# Patient Record
Sex: MALE | Race: WHITE | NOT HISPANIC OR LATINO | RURAL
[De-identification: names, ages, dates, MRNs, and addresses within clinical notes are randomized per-mention and may not be internally consistent; named-entity substitution may affect disease eponyms.]

---

## 2021-03-04 ENCOUNTER — OFFICE VISIT (OUTPATIENT)
Dept: FAMILY MEDICINE CLINIC | Facility: CLINIC | Age: 31
End: 2021-03-04

## 2021-03-04 ENCOUNTER — LAB (OUTPATIENT)
Dept: LAB | Facility: HOSPITAL | Age: 31
End: 2021-03-04

## 2021-03-04 VITALS
WEIGHT: 250 LBS | DIASTOLIC BLOOD PRESSURE: 82 MMHG | HEART RATE: 68 BPM | SYSTOLIC BLOOD PRESSURE: 118 MMHG | RESPIRATION RATE: 18 BRPM | OXYGEN SATURATION: 96 % | HEIGHT: 74 IN | BODY MASS INDEX: 32.08 KG/M2

## 2021-03-04 DIAGNOSIS — L05.91 PILONIDAL CYST WITHOUT INFECTION: ICD-10-CM

## 2021-03-04 DIAGNOSIS — Z11.59 NEED FOR HEPATITIS C SCREENING TEST: ICD-10-CM

## 2021-03-04 DIAGNOSIS — Z11.3 ROUTINE SCREENING FOR STI (SEXUALLY TRANSMITTED INFECTION): Primary | ICD-10-CM

## 2021-03-04 DIAGNOSIS — Z13.220 LIPID SCREENING: ICD-10-CM

## 2021-03-04 PROCEDURE — 87491 CHLMYD TRACH DNA AMP PROBE: CPT | Performed by: STUDENT IN AN ORGANIZED HEALTH CARE EDUCATION/TRAINING PROGRAM

## 2021-03-04 PROCEDURE — 99204 OFFICE O/P NEW MOD 45 MIN: CPT | Performed by: STUDENT IN AN ORGANIZED HEALTH CARE EDUCATION/TRAINING PROGRAM

## 2021-03-04 PROCEDURE — 86592 SYPHILIS TEST NON-TREP QUAL: CPT | Performed by: STUDENT IN AN ORGANIZED HEALTH CARE EDUCATION/TRAINING PROGRAM

## 2021-03-04 PROCEDURE — 86803 HEPATITIS C AB TEST: CPT | Performed by: STUDENT IN AN ORGANIZED HEALTH CARE EDUCATION/TRAINING PROGRAM

## 2021-03-04 PROCEDURE — 80061 LIPID PANEL: CPT | Performed by: STUDENT IN AN ORGANIZED HEALTH CARE EDUCATION/TRAINING PROGRAM

## 2021-03-04 PROCEDURE — 87661 TRICHOMONAS VAGINALIS AMPLIF: CPT | Performed by: STUDENT IN AN ORGANIZED HEALTH CARE EDUCATION/TRAINING PROGRAM

## 2021-03-04 PROCEDURE — 85025 COMPLETE CBC W/AUTO DIFF WBC: CPT | Performed by: STUDENT IN AN ORGANIZED HEALTH CARE EDUCATION/TRAINING PROGRAM

## 2021-03-04 PROCEDURE — 87591 N.GONORRHOEAE DNA AMP PROB: CPT | Performed by: STUDENT IN AN ORGANIZED HEALTH CARE EDUCATION/TRAINING PROGRAM

## 2021-03-04 PROCEDURE — 80053 COMPREHEN METABOLIC PANEL: CPT | Performed by: STUDENT IN AN ORGANIZED HEALTH CARE EDUCATION/TRAINING PROGRAM

## 2021-03-04 PROCEDURE — G0432 EIA HIV-1/HIV-2 SCREEN: HCPCS | Performed by: STUDENT IN AN ORGANIZED HEALTH CARE EDUCATION/TRAINING PROGRAM

## 2021-03-04 NOTE — PROGRESS NOTES
"   Subjective:  Edinson Thomas is a 30 y.o. male who presents for establish care    Polyp/cyst; first noticed in 2016; went to [facility], had it drained; located in gluteal cleft; no issues with BM, soft, daily BM; No fever, chills, n/v/d hematochezia. Currently asymptomatic, however has never completely healed. States has two different areas which are connected. Currently denied any drainage.  States due to recurrence, would like definitive therapy.       Health maintenance; Had COVID immunizations, has not had labs and quite some time, is sexually active, currently in a monogamous relationship for approximately 1 year, was with multiple sexual partners prior to that.  No dysuria, penile discharge, testicular pain or genital rashes.  Has never been screened for hepatitis C.     Vitals:    Vitals:    03/04/21 1327   BP: 118/82   Pulse: 68   Resp: 18   SpO2: 96%   Weight: 113 kg (250 lb)   Height: 188 cm (74\")     Body mass index is 32.1 kg/m².    Review of Systems  Review of Systems   Constitutional: Negative for chills and fever.   HENT: Negative for congestion and sore throat.    Eyes: Negative for pain and visual disturbance.   Respiratory: Negative for cough and shortness of breath.    Cardiovascular: Negative for chest pain and palpitations.   Gastrointestinal: Negative for nausea and vomiting.   Endocrine: Negative for polydipsia and polyuria.   Genitourinary: Negative for dysuria and hematuria.   Skin: Negative for rash and wound.   Neurological: Negative for dizziness and headaches.   Psychiatric/Behavioral: Negative for behavioral problems and confusion.       There is no problem list on file for this patient.    History reviewed. No pertinent surgical history.  Social History     Socioeconomic History   • Marital status: Single     Spouse name: Not on file   • Number of children: Not on file   • Years of education: Not on file   • Highest education level: Not on file   Tobacco Use   • Smoking status: " Never Smoker   • Smokeless tobacco: Never Used   Substance and Sexual Activity   • Alcohol use: Never     Frequency: Never   • Drug use: Never     Family History   Problem Relation Age of Onset   • Diabetes Father    • Heart failure Father    • Heart attack Father      No visits with results within 6 Month(s) from this visit.   Latest known visit with results is:   No results found for any previous visit.      No image results found.    [unfilled]  Immunization History   Administered Date(s) Administered   • COVID-19 (MODERNA) 12/30/2020, 01/28/2021       The following portions of the patient's history were reviewed and updated as appropriate: allergies, current medications, past family history, past medical history, past social history, past surgical history and problem list.        Physical Exam  Physical Exam  Constitutional:       General: He is not in acute distress.  HENT:      Head: Normocephalic and atraumatic.      Right Ear: Tympanic membrane and ear canal normal.      Left Ear: Tympanic membrane and ear canal normal.      Mouth/Throat:      Mouth: Mucous membranes are moist.      Pharynx: Oropharynx is clear. No posterior oropharyngeal erythema.   Eyes:      Extraocular Movements: Extraocular movements intact.      Pupils: Pupils are equal, round, and reactive to light.   Cardiovascular:      Rate and Rhythm: Normal rate and regular rhythm.      Heart sounds: Normal heart sounds. No murmur.   Pulmonary:      Effort: Pulmonary effort is normal.      Breath sounds: Normal breath sounds.   Abdominal:      General: Bowel sounds are normal.      Palpations: Abdomen is soft.      Tenderness: There is no abdominal tenderness.   Musculoskeletal:         General: No swelling.      Right lower leg: No edema.      Left lower leg: No edema.   Skin:     Coloration: Skin is not jaundiced.      Findings: No rash.          Neurological:      Mental Status: He is alert and oriented to person, place, and time. Mental  status is at baseline.   Psychiatric:         Mood and Affect: Mood normal.         Behavior: Behavior normal.       Assessment/Plan    Diagnosis Plan   1. Routine screening for STI (sexually transmitted infection)  RPR    HIV-1 / O / 2 Ag / Antibody 4th Generation    Chlamydia trachomatis, Neisseria gonorrhoeae, Trichomonas vaginalis, PCR - Swab, Vagina   2. Need for hepatitis C screening test  Hepatitis C Antibody   3. Lipid screening  Lipid Panel   4. Pilonidal cyst without infection  Comprehensive Metabolic Panel    CBC & Differential    Ambulatory Referral to General Surgery    CBC Auto Differential      Orders Placed This Encounter   Procedures   • Chlamydia trachomatis, Neisseria gonorrhoeae, Trichomonas vaginalis, PCR - Swab, Vagina   • Lipid Panel   • Comprehensive Metabolic Panel   • Hepatitis C Antibody   • RPR   • HIV-1 / O / 2 Ag / Antibody 4th Generation   • CBC Auto Differential   • Ambulatory Referral to General Surgery     Referral Priority:   Routine     Referral Type:   Consultation     Referral Reason:   Specialty Services Required     Requested Specialty:   General Surgery     Number of Visits Requested:   1   • CBC & Differential     Order Specific Question:   Manual Differential     Answer:   No     Pilonidal cyst; patient currently asymptomatic however due to recurrence will refer to surgery for definitive management, therapy/surgery.  Patient agreeable to plan, follow-up in 1 month, sooner if needed.    Labs as above, hep C screening STI screening as above, denied symptoms, counseled on safe sex practices, denied depression, anxiety, does not smoke.      This document has been electronically signed by Ulysses Portillo MD on March 5, 2021 18:46 CST

## 2021-03-04 NOTE — PATIENT INSTRUCTIONS
Pilonidal Cyst    A pilonidal cyst is a fluid-filled sac that forms beneath the skin near the tailbone, at the top of the crease of the buttocks (pilonidal area). If the cyst is not large and not infected, it may not cause any problems.  If the cyst becomes irritated or infected, it may get larger and fill with pus. An infected cyst is called an abscess. A pilonidal abscess may cause pain and swelling, and it may need to be drained or removed.  What are the causes?  The cause of this condition is not always known. In some cases, a hair that grows into your skin (ingrown hair) may be the cause.  What increases the risk?  You are more likely to get a pilonidal cyst if you:  · Are male.  · Have lots of hair near the crease of the buttocks.  · Are overweight.  · Have a dimple near the crease of the buttocks.  · Wear tight clothing.  · Do not bathe or shower often.  · Sit for long periods of time.  What are the signs or symptoms?  Signs and symptoms of a pilonidal cyst may include pain, swelling, redness, and warmth in the pilonidal area. Depending on how big the cyst is, you may be able to feel a lump near your tailbone. If your cyst becomes infected, symptoms may include:  · Pus or fluid drainage.  · Fever.  · Pain, swelling, and redness getting worse.  · The lump getting bigger.  How is this diagnosed?  This condition may be diagnosed based on:  · Your symptoms and medical history.  · A physical exam.  · A blood test to check for infection.  · Testing a pus sample, if applicable.  How is this treated?  If your cyst does not cause symptoms, you may not need any treatment. If your cyst bothers you or is infected, you may need a procedure to drain or remove the cyst. Depending on the size, location, and severity of your cyst, your health care provider may:  · Make an incision in the cyst and drain it (incision and drainage).  · Open and drain the cyst, and then stitch the wound so that it stays open while it heals  (marsupialization). You will be given instructions about how to care for your open wound while it heals.  · Remove all or part of the cyst, and then close the wound (cyst removal).  You may need to take antibiotic medicines before your procedure.  Follow these instructions at home:  Medicines  · Take over-the-counter and prescription medicines only as told by your health care provider.  · If you were prescribed an antibiotic medicine, take it as told by your health care provider. Do not stop taking the antibiotic even if you start to feel better.  General instructions  · Keep the area around your pilonidal cyst clean and dry.  · If there is fluid or pus draining from your cyst:  ? Cover the area with a clean bandage (dressing) as needed.  ? Wash the area gently with soap and water. Pat the area dry with a clean towel. Do not rub the area because that may cause bleeding.  · Remove hair from the area around the cyst only if your health care provider tells you to do this.  · Do not wear tight pants or sit in one position for long periods at a time.  · Keep all follow-up visits as told by your health care provider. This is important.  Contact a health care provider if you have:  · New redness, swelling, or pain.  · A fever.  · Severe pain.  Summary  · A pilonidal cyst is a fluid-filled sac that forms beneath the skin near the tailbone, at the top of the crease of the buttocks (pilonidal area).  · If the cyst becomes irritated or infected, it may get larger and fill with pus. An infected cyst is called an abscess.  · The cause of this condition is not always known. In some cases, a hair that grows into your skin (ingrown hair) may be the cause.  · If your cyst does not cause symptoms, you may not need any treatment. If your cyst bothers you or is infected, you may need a procedure to drain or remove the cyst.  This information is not intended to replace advice given to you by your health care provider. Make sure you  discuss any questions you have with your health care provider.  Document Revised: 12/06/2018 Document Reviewed: 12/06/2018  Elsevier Patient Education © 2020 [employer]

## 2021-03-05 LAB
ALBUMIN SERPL-MCNC: 4.9 G/DL (ref 3.5–5.2)
ALBUMIN/GLOB SERPL: 1.4 G/DL
ALP SERPL-CCNC: 91 U/L (ref 39–117)
ALT SERPL W P-5'-P-CCNC: 17 U/L (ref 1–41)
ANION GAP SERPL CALCULATED.3IONS-SCNC: 12 MMOL/L (ref 5–15)
AST SERPL-CCNC: 20 U/L (ref 1–40)
BASOPHILS # BLD AUTO: 0.08 10*3/MM3 (ref 0–0.2)
BASOPHILS NFR BLD AUTO: 1.1 % (ref 0–1.5)
BILIRUB SERPL-MCNC: 0.7 MG/DL (ref 0–1.2)
BUN SERPL-MCNC: 17 MG/DL (ref 6–20)
BUN/CREAT SERPL: 19.5 (ref 7–25)
CALCIUM SPEC-SCNC: 10 MG/DL (ref 8.6–10.5)
CHLORIDE SERPL-SCNC: 102 MMOL/L (ref 98–107)
CHOLEST SERPL-MCNC: 234 MG/DL (ref 0–200)
CO2 SERPL-SCNC: 25 MMOL/L (ref 22–29)
CREAT SERPL-MCNC: 0.87 MG/DL (ref 0.76–1.27)
DEPRECATED RDW RBC AUTO: 36.5 FL (ref 37–54)
EOSINOPHIL # BLD AUTO: 0.3 10*3/MM3 (ref 0–0.4)
EOSINOPHIL NFR BLD AUTO: 4 % (ref 0.3–6.2)
ERYTHROCYTE [DISTWIDTH] IN BLOOD BY AUTOMATED COUNT: 12.7 % (ref 12.3–15.4)
GFR SERPL CREATININE-BSD FRML MDRD: 102 ML/MIN/1.73
GLOBULIN UR ELPH-MCNC: 3.5 GM/DL
GLUCOSE SERPL-MCNC: 85 MG/DL (ref 65–99)
HCT VFR BLD AUTO: 44.4 % (ref 37.5–51)
HCV AB SER DONR QL: NORMAL
HDLC SERPL-MCNC: 43 MG/DL (ref 40–60)
HGB BLD-MCNC: 15.1 G/DL (ref 13–17.7)
HIV1+2 AB SER QL: NORMAL
IMM GRANULOCYTES # BLD AUTO: 0.04 10*3/MM3 (ref 0–0.05)
IMM GRANULOCYTES NFR BLD AUTO: 0.5 % (ref 0–0.5)
LDLC SERPL CALC-MCNC: 173 MG/DL (ref 0–100)
LDLC/HDLC SERPL: 3.98 {RATIO}
LYMPHOCYTES # BLD AUTO: 2.15 10*3/MM3 (ref 0.7–3.1)
LYMPHOCYTES NFR BLD AUTO: 28.3 % (ref 19.6–45.3)
MCH RBC QN AUTO: 27.9 PG (ref 26.6–33)
MCHC RBC AUTO-ENTMCNC: 34 G/DL (ref 31.5–35.7)
MCV RBC AUTO: 81.9 FL (ref 79–97)
MONOCYTES # BLD AUTO: 0.72 10*3/MM3 (ref 0.1–0.9)
MONOCYTES NFR BLD AUTO: 9.5 % (ref 5–12)
NEUTROPHILS NFR BLD AUTO: 4.3 10*3/MM3 (ref 1.7–7)
NEUTROPHILS NFR BLD AUTO: 56.6 % (ref 42.7–76)
NRBC BLD AUTO-RTO: 0 /100 WBC (ref 0–0.2)
PLATELET # BLD AUTO: 222 10*3/MM3 (ref 140–450)
PMV BLD AUTO: 12.4 FL (ref 6–12)
POTASSIUM SERPL-SCNC: 4.5 MMOL/L (ref 3.5–5.2)
PROT SERPL-MCNC: 8.4 G/DL (ref 6–8.5)
RBC # BLD AUTO: 5.42 10*6/MM3 (ref 4.14–5.8)
SODIUM SERPL-SCNC: 139 MMOL/L (ref 136–145)
TRIGL SERPL-MCNC: 100 MG/DL (ref 0–150)
VLDLC SERPL-MCNC: 18 MG/DL (ref 5–40)
WBC # BLD AUTO: 7.59 10*3/MM3 (ref 3.4–10.8)

## 2021-03-06 LAB
C TRACH RRNA CVX QL NAA+PROBE: NEGATIVE
N GONORRHOEA RRNA SPEC QL NAA+PROBE: NEGATIVE
RPR SER QL: NORMAL

## 2021-03-11 ENCOUNTER — OFFICE VISIT (OUTPATIENT)
Dept: SURGERY | Facility: CLINIC | Age: 31
End: 2021-03-11

## 2021-03-11 ENCOUNTER — TELEPHONE (OUTPATIENT)
Dept: FAMILY MEDICINE CLINIC | Facility: CLINIC | Age: 31
End: 2021-03-11

## 2021-03-11 VITALS
DIASTOLIC BLOOD PRESSURE: 72 MMHG | TEMPERATURE: 97.2 F | HEIGHT: 74 IN | SYSTOLIC BLOOD PRESSURE: 134 MMHG | WEIGHT: 252.8 LBS | HEART RATE: 59 BPM | BODY MASS INDEX: 32.44 KG/M2

## 2021-03-11 DIAGNOSIS — L05.91 PILONIDAL CYST: Primary | ICD-10-CM

## 2021-03-11 PROCEDURE — 99203 OFFICE O/P NEW LOW 30 MIN: CPT | Performed by: SURGERY

## 2021-03-11 NOTE — TELEPHONE ENCOUNTER
Mr. Thomas has an appointment today, he was just informed that the Dr is not in his network. He would like to see if he can be placed with someone else that is network. Please call

## 2021-03-18 NOTE — PROGRESS NOTES
CHIEF COMPLAINT:    Assess pilonidal disease    HISTORY OF PRESENT ILLNESS:    Edinson Thomas is a 30 y.o. male who is referred by his PCP for assessment of pilonidal disease.  He is previously undergone incision and drainage in 2016.  Currently he notes no significant symptoms from his pilonidal disease.    Patient does note recent constipation as well as bright red blood in his stool and pain with bowel movements.    History reviewed. No pertinent past medical history.    History reviewed. No pertinent surgical history.    Prior to Admission medications    Not on File       No Known Allergies    Family History   Problem Relation Age of Onset   • Diabetes Father    • Heart failure Father    • Heart attack Father        Social History     Socioeconomic History   • Marital status: Single     Spouse name: Not on file   • Number of children: Not on file   • Years of education: Not on file   • Highest education level: Not on file   Tobacco Use   • Smoking status: Never Smoker   • Smokeless tobacco: Never Used   Substance and Sexual Activity   • Alcohol use: Never   • Drug use: Never       Review of Systems   Constitutional: Negative for activity change, appetite change, chills and fever.   HENT: Negative for hearing loss, nosebleeds and trouble swallowing.    Cardiovascular: Negative for chest pain, palpitations and leg swelling.   Gastrointestinal: Positive for blood in stool, constipation and rectal pain. Negative for abdominal distention, abdominal pain, anal bleeding, diarrhea, nausea and vomiting.   Endocrine: Negative for cold intolerance, heat intolerance, polydipsia and polyuria.   Genitourinary: Negative for decreased urine volume, difficulty urinating, dysuria, enuresis, frequency, hematuria and urgency.   Musculoskeletal: Negative for arthralgias, back pain, gait problem, myalgias and neck pain.   Skin: Negative for pallor, rash and wound.   Allergic/Immunologic: Negative for immunocompromised state.  "  Neurological: Negative for dizziness, seizures, weakness, light-headedness, numbness and headaches.   Psychiatric/Behavioral: Negative for agitation and behavioral problems. The patient is not nervous/anxious.        Objective     /72   Pulse 59   Temp 97.2 °F (36.2 °C) (Temporal)   Ht 188 cm (74\")   Wt 115 kg (252 lb 12.8 oz)   BMI 32.46 kg/m²     Physical Exam  Vitals reviewed.   Constitutional:       General: He is not in acute distress.     Appearance: Normal appearance. He is not ill-appearing, toxic-appearing or diaphoretic.   HENT:      Head: Normocephalic and atraumatic.   Eyes:      General: No scleral icterus.        Right eye: No discharge.         Left eye: No discharge.      Extraocular Movements: Extraocular movements intact.      Conjunctiva/sclera: Conjunctivae normal.   Cardiovascular:      Rate and Rhythm: Normal rate.   Pulmonary:      Effort: Pulmonary effort is normal. No respiratory distress.   Abdominal:      Palpations: Abdomen is soft.   Genitourinary:     Rectum: Anal fissure present.       Skin:     General: Skin is warm.   Neurological:      General: No focal deficit present.      Mental Status: He is alert and oriented to person, place, and time.   Psychiatric:         Mood and Affect: Mood normal.         Behavior: Behavior normal.         Thought Content: Thought content normal.         Judgment: Judgment normal.           ASSESSMENT:    Minimal asymptomatic pilonidal disease    Acute anal fissure    PLAN:    The patient has minimal pilonidal disease and does not desire any further treatment of that at this time.  More acutely the patient does have an anal fissure which is currently symptomatic.  I discussed with him treatment of this including topical medication which was prescribed him today.  We will see him back in 2 weeks to assess healing of the fissure.          This document has been electronically signed by Onel Tse MD on March 18, 2021 13:55 CDT  "

## 2021-03-26 ENCOUNTER — OFFICE VISIT (OUTPATIENT)
Dept: SURGERY | Facility: CLINIC | Age: 31
End: 2021-03-26

## 2021-03-26 VITALS
HEIGHT: 74 IN | WEIGHT: 248.2 LBS | DIASTOLIC BLOOD PRESSURE: 74 MMHG | TEMPERATURE: 97.6 F | SYSTOLIC BLOOD PRESSURE: 120 MMHG | BODY MASS INDEX: 31.85 KG/M2 | HEART RATE: 56 BPM

## 2021-03-26 DIAGNOSIS — L05.91 PILONIDAL CYST: Primary | ICD-10-CM

## 2021-03-26 PROCEDURE — 99212 OFFICE O/P EST SF 10 MIN: CPT | Performed by: SURGERY

## 2021-03-26 RX ORDER — SODIUM CHLORIDE 9 MG/ML
100 INJECTION, SOLUTION INTRAVENOUS CONTINUOUS
Status: CANCELLED | OUTPATIENT
Start: 2021-03-31

## 2021-03-26 RX ORDER — BUPIVACAINE HCL/0.9 % NACL/PF 0.1 %
2 PLASTIC BAG, INJECTION (ML) EPIDURAL ONCE
Status: CANCELLED | OUTPATIENT
Start: 2021-03-31 | End: 2021-03-26

## 2021-03-26 NOTE — PROGRESS NOTES
CHIEF COMPLAINT:    Recheck pilonidal disease and anal fissure    HISTORY OF PRESENT ILLNESS:    Edinson Thomas is a 30 y.o. male who was seen previously for pilonidal disease which is chronic as well as an acute anal fissure.  He was treated for his acute anal fissure which has now symptomatically resolved.  He continues to have drainage from the pilonidal area.    History reviewed. No pertinent past medical history.    History reviewed. No pertinent surgical history.    Prior to Admission medications    Not on File       No Known Allergies    Family History   Problem Relation Age of Onset   • Diabetes Father    • Heart failure Father    • Heart attack Father        Social History     Socioeconomic History   • Marital status: Single     Spouse name: Not on file   • Number of children: Not on file   • Years of education: Not on file   • Highest education level: Not on file   Tobacco Use   • Smoking status: Never Smoker   • Smokeless tobacco: Never Used   Substance and Sexual Activity   • Alcohol use: Never   • Drug use: Never       Review of Systems   Constitutional: Negative for activity change, appetite change, chills and fever.   HENT: Negative for hearing loss, nosebleeds and trouble swallowing.    Cardiovascular: Negative for chest pain, palpitations and leg swelling.   Gastrointestinal: Negative for abdominal distention, abdominal pain, anal bleeding, blood in stool, constipation, diarrhea, nausea, rectal pain and vomiting.   Endocrine: Negative for cold intolerance, heat intolerance, polydipsia and polyuria.   Genitourinary: Negative for decreased urine volume, difficulty urinating, dysuria, enuresis, frequency, hematuria and urgency.   Musculoskeletal: Negative for arthralgias, back pain, gait problem, myalgias and neck pain.   Skin: Negative for pallor, rash and wound.   Allergic/Immunologic: Negative for immunocompromised state.   Neurological: Negative for dizziness, seizures, weakness, light-headedness,  "numbness and headaches.   Psychiatric/Behavioral: Negative for agitation and behavioral problems. The patient is not nervous/anxious.        Objective     /74   Pulse 56   Temp 97.6 °F (36.4 °C) (Temporal)   Ht 188 cm (74\")   Wt 113 kg (248 lb 3.2 oz)   BMI 31.87 kg/m²     Physical Exam  Vitals reviewed.   Constitutional:       General: He is not in acute distress.     Appearance: Normal appearance. He is not ill-appearing, toxic-appearing or diaphoretic.   HENT:      Head: Normocephalic and atraumatic.   Eyes:      General: No scleral icterus.        Right eye: No discharge.         Left eye: No discharge.      Extraocular Movements: Extraocular movements intact.      Conjunctiva/sclera: Conjunctivae normal.   Cardiovascular:      Rate and Rhythm: Normal rate.   Pulmonary:      Effort: Pulmonary effort is normal. No respiratory distress.   Skin:     General: Skin is warm.          Neurological:      General: No focal deficit present.      Mental Status: He is alert and oriented to person, place, and time.   Psychiatric:         Mood and Affect: Mood normal.         Behavior: Behavior normal.         Thought Content: Thought content normal.         Judgment: Judgment normal.           ASSESSMENT:    Pilonidal cyst with abscess, healed anal fissure    PLAN:    His anal fissure has now healed.  He would like to have his pilonidal disease treated.  I recommended that he undergo excision of the area with rhomboid flap closure.  He is agreeable to this.  The risks and benefits of pilonidal cystectomy with rhomboid flap closure were discussed.  He is scheduled for 3/31/2021.          This document has been electronically signed by Onel Tse MD on March 26, 2021 09:59 CDT      "

## 2021-03-28 ENCOUNTER — LAB (OUTPATIENT)
Dept: LAB | Facility: HOSPITAL | Age: 31
End: 2021-03-28

## 2021-03-28 DIAGNOSIS — Z01.818 PREOP TESTING: Primary | ICD-10-CM

## 2021-03-28 LAB — SARS-COV-2 N GENE RESP QL NAA+PROBE: NOT DETECTED

## 2021-03-28 PROCEDURE — 87635 SARS-COV-2 COVID-19 AMP PRB: CPT

## 2021-03-28 PROCEDURE — C9803 HOPD COVID-19 SPEC COLLECT: HCPCS

## 2021-03-31 ENCOUNTER — HOSPITAL ENCOUNTER (OUTPATIENT)
Facility: HOSPITAL | Age: 31
Setting detail: HOSPITAL OUTPATIENT SURGERY
Discharge: HOME | End: 2021-03-31
Attending: SURGERY | Admitting: SURGERY

## 2021-03-31 ENCOUNTER — ANESTHESIA (OUTPATIENT)
Dept: PERIOP | Facility: HOSPITAL | Age: 31
End: 2021-03-31

## 2021-03-31 ENCOUNTER — ANESTHESIA EVENT (OUTPATIENT)
Dept: PERIOP | Facility: HOSPITAL | Age: 31
End: 2021-03-31

## 2021-03-31 VITALS
WEIGHT: 249.12 LBS | BODY MASS INDEX: 31.97 KG/M2 | HEIGHT: 74 IN | HEART RATE: 56 BPM | SYSTOLIC BLOOD PRESSURE: 117 MMHG | OXYGEN SATURATION: 98 % | RESPIRATION RATE: 18 BRPM | DIASTOLIC BLOOD PRESSURE: 66 MMHG | TEMPERATURE: 97.4 F

## 2021-03-31 DIAGNOSIS — L05.91 PILONIDAL CYST: ICD-10-CM

## 2021-03-31 PROCEDURE — 25010000002 SUCCINYLCHOLINE PER 20 MG: Performed by: NURSE ANESTHETIST, CERTIFIED REGISTERED

## 2021-03-31 PROCEDURE — 25010000002 MIDAZOLAM PER 1 MG: Performed by: NURSE ANESTHETIST, CERTIFIED REGISTERED

## 2021-03-31 PROCEDURE — 11772 EXC PILONIDAL CYST COMP: CPT | Performed by: SURGERY

## 2021-03-31 PROCEDURE — 25010000002 CEFAZOLIN PER 500 MG: Performed by: SURGERY

## 2021-03-31 PROCEDURE — 25010000002 ONDANSETRON PER 1 MG: Performed by: NURSE ANESTHETIST, CERTIFIED REGISTERED

## 2021-03-31 PROCEDURE — 25010000002 DEXAMETHASONE PER 1 MG: Performed by: NURSE ANESTHETIST, CERTIFIED REGISTERED

## 2021-03-31 PROCEDURE — 25010000002 FENTANYL CITRATE (PF) 100 MCG/2ML SOLUTION: Performed by: NURSE ANESTHETIST, CERTIFIED REGISTERED

## 2021-03-31 PROCEDURE — 25010000002 PROPOFOL 10 MG/ML EMULSION: Performed by: NURSE ANESTHETIST, CERTIFIED REGISTERED

## 2021-03-31 RX ORDER — BUPIVACAINE HYDROCHLORIDE 5 MG/ML
INJECTION, SOLUTION EPIDURAL; INTRACAUDAL AS NEEDED
Status: DISCONTINUED | OUTPATIENT
Start: 2021-03-31 | End: 2021-03-31 | Stop reason: HOSPADM

## 2021-03-31 RX ORDER — HYDROCODONE BITARTRATE AND ACETAMINOPHEN 5; 325 MG/1; MG/1
1 TABLET ORAL EVERY 6 HOURS PRN
Qty: 24 TABLET | Refills: 0 | Status: SHIPPED | OUTPATIENT
Start: 2021-03-31 | End: 2021-08-24

## 2021-03-31 RX ORDER — ONDANSETRON 2 MG/ML
4 INJECTION INTRAMUSCULAR; INTRAVENOUS ONCE AS NEEDED
Status: DISCONTINUED | OUTPATIENT
Start: 2021-03-31 | End: 2021-03-31 | Stop reason: HOSPADM

## 2021-03-31 RX ORDER — LIDOCAINE HYDROCHLORIDE 20 MG/ML
INJECTION, SOLUTION INFILTRATION; PERINEURAL AS NEEDED
Status: DISCONTINUED | OUTPATIENT
Start: 2021-03-31 | End: 2021-03-31 | Stop reason: SURG

## 2021-03-31 RX ORDER — DEXAMETHASONE SODIUM PHOSPHATE 4 MG/ML
INJECTION, SOLUTION INTRA-ARTICULAR; INTRALESIONAL; INTRAMUSCULAR; INTRAVENOUS; SOFT TISSUE AS NEEDED
Status: DISCONTINUED | OUTPATIENT
Start: 2021-03-31 | End: 2021-03-31 | Stop reason: SURG

## 2021-03-31 RX ORDER — FENTANYL CITRATE 50 UG/ML
INJECTION, SOLUTION INTRAMUSCULAR; INTRAVENOUS AS NEEDED
Status: DISCONTINUED | OUTPATIENT
Start: 2021-03-31 | End: 2021-03-31 | Stop reason: SURG

## 2021-03-31 RX ORDER — MIDAZOLAM HYDROCHLORIDE 1 MG/ML
INJECTION INTRAMUSCULAR; INTRAVENOUS AS NEEDED
Status: DISCONTINUED | OUTPATIENT
Start: 2021-03-31 | End: 2021-03-31 | Stop reason: SURG

## 2021-03-31 RX ORDER — BUPIVACAINE HCL/0.9 % NACL/PF 0.1 %
2 PLASTIC BAG, INJECTION (ML) EPIDURAL ONCE
Status: COMPLETED | OUTPATIENT
Start: 2021-03-31 | End: 2021-03-31

## 2021-03-31 RX ORDER — PROPOFOL 10 MG/ML
VIAL (ML) INTRAVENOUS AS NEEDED
Status: DISCONTINUED | OUTPATIENT
Start: 2021-03-31 | End: 2021-03-31 | Stop reason: SURG

## 2021-03-31 RX ORDER — ROCURONIUM BROMIDE 10 MG/ML
INJECTION, SOLUTION INTRAVENOUS AS NEEDED
Status: DISCONTINUED | OUTPATIENT
Start: 2021-03-31 | End: 2021-03-31 | Stop reason: SURG

## 2021-03-31 RX ORDER — ONDANSETRON 2 MG/ML
INJECTION INTRAMUSCULAR; INTRAVENOUS AS NEEDED
Status: DISCONTINUED | OUTPATIENT
Start: 2021-03-31 | End: 2021-03-31 | Stop reason: SURG

## 2021-03-31 RX ORDER — SUCCINYLCHOLINE CHLORIDE 20 MG/ML
INJECTION INTRAMUSCULAR; INTRAVENOUS AS NEEDED
Status: DISCONTINUED | OUTPATIENT
Start: 2021-03-31 | End: 2021-03-31 | Stop reason: SURG

## 2021-03-31 RX ORDER — SODIUM CHLORIDE 9 MG/ML
100 INJECTION, SOLUTION INTRAVENOUS CONTINUOUS
Status: DISCONTINUED | OUTPATIENT
Start: 2021-03-31 | End: 2021-03-31 | Stop reason: HOSPADM

## 2021-03-31 RX ADMIN — FENTANYL CITRATE 50 MCG: 50 INJECTION INTRAMUSCULAR; INTRAVENOUS at 12:05

## 2021-03-31 RX ADMIN — SUCCINYLCHOLINE CHLORIDE 140 MG: 20 INJECTION, SOLUTION INTRAMUSCULAR; INTRAVENOUS at 12:10

## 2021-03-31 RX ADMIN — LIDOCAINE HYDROCHLORIDE 100 MG: 20 INJECTION, SOLUTION INFILTRATION; PERINEURAL at 12:10

## 2021-03-31 RX ADMIN — FENTANYL CITRATE 50 MCG: 50 INJECTION INTRAMUSCULAR; INTRAVENOUS at 12:17

## 2021-03-31 RX ADMIN — MIDAZOLAM HYDROCHLORIDE 2 MG: 2 INJECTION, SOLUTION INTRAMUSCULAR; INTRAVENOUS at 12:02

## 2021-03-31 RX ADMIN — SODIUM CHLORIDE 100 ML/HR: 9 INJECTION, SOLUTION INTRAVENOUS at 10:25

## 2021-03-31 RX ADMIN — ROCURONIUM BROMIDE 5 MG: 50 INJECTION INTRAVENOUS at 12:09

## 2021-03-31 RX ADMIN — ONDANSETRON 4 MG: 2 INJECTION INTRAMUSCULAR; INTRAVENOUS at 12:28

## 2021-03-31 RX ADMIN — DEXAMETHASONE SODIUM PHOSPHATE 4 MG: 4 INJECTION, SOLUTION INTRAMUSCULAR; INTRAVENOUS at 12:28

## 2021-03-31 RX ADMIN — PROPOFOL 200 MG: 10 INJECTION, EMULSION INTRAVENOUS at 12:10

## 2021-03-31 RX ADMIN — Medication 2 G: at 12:07

## 2021-03-31 NOTE — ANESTHESIA POSTPROCEDURE EVALUATION
Patient: Edinson Thomas    Procedure Summary     Date: 03/31/21 Room / Location: [facility] OR 03 /  [facility]    Anesthesia Start: 1205 Anesthesia Stop: 1313    Procedure: PILONIDAL CYSTECTOMY with rhomboid flap closure  (N/A Back) Diagnosis:       Pilonidal cyst      (Pilonidal cyst [L05.91])    Surgeons: Onel Tse MD Provider: Kaitlin Pfeiffer DO    Anesthesia Type: general ASA Status: 2          Anesthesia Type: general    Vitals  No vitals data found for the desired time range.          Post Anesthesia Care and Evaluation    Patient location during evaluation: PACU  Patient participation: complete - patient participated  Level of consciousness: sleepy but conscious  Pain score: 0  Pain management: adequate  Airway patency: patent  Anesthetic complications: No anesthetic complications  PONV Status: none  Cardiovascular status: hemodynamically stable  Respiratory status: spontaneous ventilation and room air  Hydration status: acceptable

## 2021-03-31 NOTE — ANESTHESIA PREPROCEDURE EVALUATION
Anesthesia Evaluation     Patient summary reviewed and Nursing notes reviewed   no history of anesthetic complications:  NPO Solid Status: > 8 hours  NPO Liquid Status: > 8 hours           Airway   Mallampati: II  TM distance: >3 FB  Neck ROM: full  Possible difficult intubation  Dental - normal exam     Pulmonary - normal exam    breath sounds clear to auscultation  (-) asthma, rhonchi, decreased breath sounds, wheezes, not a smoker  Cardiovascular   Exercise tolerance: good (4-7 METS)    Rhythm: regular  Rate: normal    (-) valvular problems/murmurs, dysrhythmias, murmur      Neuro/Psych  (-) seizures  GI/Hepatic/Renal/Endo    (+) obesity,     (-) morbid obesity, GERD, diabetes    Musculoskeletal (-) negative ROS    Abdominal   (+) obese,    Substance History - negative use     OB/GYN negative ob/gyn ROS         Other - negative ROS       ROS/Med Hx Other: 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

## 2021-03-31 NOTE — ANESTHESIA PROCEDURE NOTES
Airway  Date/Time: 3/31/2021 12:12 PM  End Time:3/31/2021 12:12 PM  Airway not difficult    General Information and Staff    Patient location during procedure: OR  CRNA: Julio Cesar Smith CRNA    Indications and Patient Condition  Indications for airway management: airway protection    Preoxygenated: yes  Mask difficulty assessment: 0 - not attempted    Final Airway Details  Final airway type: endotracheal airway      Successful airway: ETT  Cuffed: yes   Successful intubation technique: direct laryngoscopy  Facilitating devices/methods: intubating stylet  Endotracheal tube insertion site: oral  Blade: Chadwick  Blade size: 4  ETT size (mm): 7.5  Cormack-Lehane Classification: grade I - full view of glottis  Placement verified by: chest auscultation and capnometry   Cuff volume (mL): 8  Measured from: lips  ETT/EBT  to lips (cm): 22  Number of attempts at approach: 1  Assessment: lips, teeth, and gum same as pre-op and atraumatic intubation

## 2021-04-02 LAB
LAB AP CASE REPORT: NORMAL
PATH REPORT.FINAL DX SPEC: NORMAL

## 2021-04-08 ENCOUNTER — OFFICE VISIT (OUTPATIENT)
Dept: SURGERY | Facility: CLINIC | Age: 31
End: 2021-04-08

## 2021-04-08 VITALS
SYSTOLIC BLOOD PRESSURE: 110 MMHG | TEMPERATURE: 97.9 F | HEIGHT: 74 IN | DIASTOLIC BLOOD PRESSURE: 82 MMHG | HEART RATE: 81 BPM | WEIGHT: 258 LBS | BODY MASS INDEX: 33.11 KG/M2

## 2021-04-08 DIAGNOSIS — Z09 FOLLOW UP: Primary | ICD-10-CM

## 2021-04-08 PROCEDURE — 99024 POSTOP FOLLOW-UP VISIT: CPT | Performed by: SURGERY

## 2021-04-08 NOTE — PROGRESS NOTES
CHIEF COMPLAINT:    Chief Complaint   Patient presents with   • Post-op     Pilonidal cystectomy with rhomboid flap closure on 3/31/21.       HISTORY OF PRESENT ILLNESS:    Edinson Thomas is a 30 y.o. male who underwent pilonidal cystectomy with flap closure.  He returns today for follow up. He has mild incisional pain, serous drainage    EXAM:  Vitals:    04/08/21 1026   BP: 110/82   Pulse: 81   Temp: 97.9 °F (36.6 °C)         Healing pilonidal flap     ASSESSMENT:    S/p pilonidal cystectomy with flap closure    PLAN:    Overall doing well. Will see in 2 weeks.          This document has been electronically signed by Onel Tse MD on April 8, 2021 10:43 CDT

## 2021-04-13 ENCOUNTER — TELEPHONE (OUTPATIENT)
Dept: SURGERY | Facility: CLINIC | Age: 31
End: 2021-04-13

## 2021-04-13 NOTE — TELEPHONE ENCOUNTER
HAD PIL CYST SURGERY 2 WKS AGO.      YESTERDAY HE BACKED INTO A PIECE OF EQUIPMENT AND TORE ABOUT A 1/2 HOLE IN THE INCISION.      IT CONTINUES TO DRAIN.      HE HAS A FOLLOW UP APPT ON APRIL 23.    WOULD LIKE TO TALK TO YOU BUT DOESN'T WANT TO DRIVE SEVERAL HOURS TO COME IN TODAY IF YOU DON'T THINK ITS NECESSARY.       PLEASE CALL    PT   # [phone]

## 2021-04-23 ENCOUNTER — OFFICE VISIT (OUTPATIENT)
Dept: SURGERY | Facility: CLINIC | Age: 31
End: 2021-04-23

## 2021-04-23 VITALS
BODY MASS INDEX: 33.32 KG/M2 | TEMPERATURE: 97.7 F | SYSTOLIC BLOOD PRESSURE: 110 MMHG | OXYGEN SATURATION: 99 % | HEART RATE: 69 BPM | WEIGHT: 259.6 LBS | HEIGHT: 74 IN | DIASTOLIC BLOOD PRESSURE: 60 MMHG

## 2021-04-23 DIAGNOSIS — Z09 FOLLOW UP: Primary | ICD-10-CM

## 2021-04-23 PROCEDURE — 99024 POSTOP FOLLOW-UP VISIT: CPT | Performed by: SURGERY

## 2021-04-23 RX ORDER — DIPHENHYDRAMINE HCL 25 MG
25 TABLET ORAL NIGHTLY PRN
COMMUNITY
End: 2021-08-24

## 2021-04-23 RX ORDER — MULTIPLE VITAMINS W/ MINERALS TAB 9MG-400MCG
1 TAB ORAL DAILY
COMMUNITY
End: 2021-08-24

## 2021-05-06 ENCOUNTER — OFFICE VISIT (OUTPATIENT)
Dept: SURGERY | Facility: CLINIC | Age: 31
End: 2021-05-06

## 2021-05-06 VITALS
SYSTOLIC BLOOD PRESSURE: 100 MMHG | HEART RATE: 63 BPM | BODY MASS INDEX: 33.34 KG/M2 | TEMPERATURE: 97.7 F | WEIGHT: 259.8 LBS | DIASTOLIC BLOOD PRESSURE: 64 MMHG | OXYGEN SATURATION: 98 % | HEIGHT: 74 IN

## 2021-05-06 DIAGNOSIS — Z09 FOLLOW UP: Primary | ICD-10-CM

## 2021-05-06 PROCEDURE — 99024 POSTOP FOLLOW-UP VISIT: CPT | Performed by: SURGERY

## 2021-05-06 NOTE — PROGRESS NOTES
CHIEF COMPLAINT:    Chief Complaint   Patient presents with   • Post-op       HISTORY OF PRESENT ILLNESS:    Edinson Thomas is a 30 y.o. male who underwent prior pilonidal cystectomy with rhomboid flap closure.  Unfortunately portion of his wound did become avulsed postoperatively.  Overall however, he is doing well.  He notes minimal discomfort in the area after being seated for a long time, particularly on the lawnmower or other firm seat.  He continues to have some serous drainage at the site.    EXAM:  Vitals:    05/06/21 0858   BP: 100/64   Pulse: 63   Temp: 97.7 °F (36.5 °C)   SpO2: 98%         Granulating area at disrupted border of rhomboid flap    ASSESSMENT:    Status post pilonidal cystectomy with rhomboid flap closure    PLAN:    Overall he continues to heal fairly well.  Continue local wound care.  Follow-up in 3 weeks.          This document has been electronically signed by Onel Tse MD on May 6, 2021 09:11 CDT

## 2021-05-28 ENCOUNTER — OFFICE VISIT (OUTPATIENT)
Dept: SURGERY | Facility: CLINIC | Age: 31
End: 2021-05-28

## 2021-05-28 VITALS
BODY MASS INDEX: 32.55 KG/M2 | DIASTOLIC BLOOD PRESSURE: 70 MMHG | WEIGHT: 253.6 LBS | OXYGEN SATURATION: 99 % | HEIGHT: 74 IN | TEMPERATURE: 96.6 F | SYSTOLIC BLOOD PRESSURE: 120 MMHG | HEART RATE: 66 BPM

## 2021-05-28 DIAGNOSIS — Z09 FOLLOW UP: Primary | ICD-10-CM

## 2021-05-28 PROCEDURE — 99024 POSTOP FOLLOW-UP VISIT: CPT | Performed by: SURGERY

## 2021-05-28 RX ORDER — CETIRIZINE HYDROCHLORIDE 10 MG/1
10 TABLET ORAL DAILY
COMMUNITY
End: 2021-08-24

## 2021-05-28 NOTE — PATIENT INSTRUCTIONS
"BMI for Adults  What is BMI?  Body mass index (BMI) is a number that is calculated from a person's weight and height. BMI can help estimate how much of a person's weight is composed of fat. BMI does not measure body fat directly. Rather, it is an alternative to procedures that directly measure body fat, which can be difficult and expensive.  BMI can help identify people who may be at higher risk for certain medical problems.  What are BMI measurements used for?  BMI is used as a screening tool to identify possible weight problems. It helps determine whether a person is obese, overweight, a healthy weight, or underweight.  BMI is useful for:  · Identifying a weight problem that may be related to a medical condition or may increase the risk for medical problems.  · Promoting changes, such as changes in diet and exercise, to help reach a healthy weight. BMI screening can be repeated to see if these changes are working.  How is BMI calculated?  BMI involves measuring your weight in relation to your height. Both height and weight are measured, and the BMI is calculated from those numbers. This can be done either in English (U[place] or metric measurements. Note that charts and online BMI calculators are available to help you find your BMI quickly and easily without having to do these calculations yourself.  To calculate your BMI in English (U.S[place] measurements:    1. Measure your weight in pounds (lb).  2. Multiply the number of pounds by 703.  ? For example, for a person who weighs 180 lb, multiply that number by 703, which equals 126,540.  3. Measure your height in inches. Then multiply that number by itself to get a measurement called \"inches squared.\"  ? For example, for a person who is 70 inches tall, the \"inches squared\" measurement is 70 inches x 70 inches, which equals 4,900 inches squared.  4. Divide the total from step 2 (number of lb x 703) by the total from step 3 (inches squared): 126,540 ÷ 4,900 = 25.8. This is " "your BMI.  To calculate your BMI in metric measurements:  1. Measure your weight in kilograms (kg).  2. Measure your height in meters (m). Then multiply that number by itself to get a measurement called \"meters squared.\"  ? For example, for a person who is 1.75 m tall, the \"meters squared\" measurement is 1.75 m x 1.75 m, which is equal to 3.1 meters squared.  3. Divide the number of kilograms (your weight) by the meters squared number. In this example: 70 ÷ 3.1 = 22.6. This is your BMI.  What do the results mean?  BMI charts are used to identify whether you are underweight, normal weight, overweight, or obese. The following guidelines will be used:  · Underweight: BMI less than 18.5.  · Normal weight: BMI between 18.5 and 24.9.  · Overweight: BMI between 25 and 29.9.  · Obese: BMI of 30 or above.  Keep these notes in mind:  · Weight includes both fat and muscle, so someone with a muscular build, such as an athlete, may have a BMI that is higher than 24.9. In cases like these, BMI is not an accurate measure of body fat.  · To determine if excess body fat is the cause of a BMI of 25 or higher, further assessments may need to be done by a health care provider.  · BMI is usually interpreted in the same way for men and women.  Where to find more information  For more information about BMI, including tools to quickly calculate your BMI, go to these websites:  · Centers for Disease Control and Prevention: www.cdc.gov  · American Heart Association: www.heart.org  · National Heart, Lung, and Blood [place]: www.nhlbi.nih.gov  Summary  · Body mass index (BMI) is a number that is calculated from a person's weight and height.  · BMI may help estimate how much of a person's weight is composed of fat. BMI can help identify those who may be at higher risk for certain medical problems.  · BMI can be measured using English measurements or metric measurements.  · BMI charts are used to identify whether you are underweight, normal " weight, overweight, or obese.  This information is not intended to replace advice given to you by your health care provider. Make sure you discuss any questions you have with your health care provider.  Document Revised: 09/09/2020 Document Reviewed: 07/17/2020  Elsevier Patient Education © 2021 [employer]

## 2021-05-28 NOTE — PROGRESS NOTES
CHIEF COMPLAINT:    Chief Complaint   Patient presents with   • Post-op       HISTORY OF PRESENT ILLNESS:    Edinson Thomas is a 30 y.o. male who underwent pilonidal cystectomy with flap closure.  He is here today for additional follow up. Overall doing well. Has occasional bloody spotting.    EXAM:  Vitals:    05/28/21 0852   BP: 120/70   Pulse: 66   Temp: 96.6 °F (35.9 °C)   SpO2: 99%         Near complete healing of flap closure.    ASSESSMENT:    S/p pilonidal cystectomy    PLAN:    Overall doing well, will reassess in one month.          This document has been electronically signed by Onel Tse MD on May 28, 2021 09:05 CDT

## 2021-07-09 ENCOUNTER — OFFICE VISIT (OUTPATIENT)
Dept: SURGERY | Facility: CLINIC | Age: 31
End: 2021-07-09

## 2021-07-09 VITALS
BODY MASS INDEX: 34.73 KG/M2 | OXYGEN SATURATION: 97 % | HEIGHT: 74 IN | TEMPERATURE: 98.4 F | DIASTOLIC BLOOD PRESSURE: 64 MMHG | SYSTOLIC BLOOD PRESSURE: 100 MMHG | HEART RATE: 64 BPM | WEIGHT: 270.6 LBS

## 2021-07-09 DIAGNOSIS — Z09 FOLLOW UP: Primary | ICD-10-CM

## 2021-07-09 PROCEDURE — 99024 POSTOP FOLLOW-UP VISIT: CPT | Performed by: SURGERY

## 2021-07-09 NOTE — PROGRESS NOTES
CHIEF COMPLAINT:    Chief Complaint   Patient presents with   • Post-op       HISTORY OF PRESENT ILLNESS:    Edinson Thomas is a 30 y.o. male who underwent prior pilonidal cystectomy with rhomboid flap closure.  He is here today for follow-up.  Overall he is doing well.  He reports no drainage or pain in the area.  He does have a patch of numbness in the area.  Overall he is satisfied that things have healed appropriately.  He had been riding a tractor about 16 hours a day recently with no issues.    EXAM:  Vitals:    07/09/21 0916   BP: 100/64   Pulse: 64   Temp: 98.4 °F (36.9 °C)   SpO2: 97%         Well-healed rhomboid flap    ASSESSMENT:    Status post pilonidal cystectomy with flap closure    PLAN:    Overall doing well.  See us as needed.          This document has been electronically signed by Onel Tse MD on July 9, 2021 10:57 CDT

## 2021-08-23 ENCOUNTER — TELEPHONE (OUTPATIENT)
Dept: FAMILY MEDICINE CLINIC | Facility: CLINIC | Age: 31
End: 2021-08-23

## 2021-08-23 NOTE — TELEPHONE ENCOUNTER
Patients insurance has to be paid today. He might call back to tell us he paid it. Then verify its active again.

## 2021-08-24 ENCOUNTER — OFFICE VISIT (OUTPATIENT)
Dept: FAMILY MEDICINE CLINIC | Facility: CLINIC | Age: 31
End: 2021-08-24

## 2021-08-24 ENCOUNTER — LAB (OUTPATIENT)
Dept: LAB | Facility: HOSPITAL | Age: 31
End: 2021-08-24

## 2021-08-24 VITALS
SYSTOLIC BLOOD PRESSURE: 120 MMHG | OXYGEN SATURATION: 98 % | HEART RATE: 76 BPM | TEMPERATURE: 98.5 F | DIASTOLIC BLOOD PRESSURE: 86 MMHG | WEIGHT: 279.5 LBS | HEIGHT: 74 IN | BODY MASS INDEX: 35.87 KG/M2

## 2021-08-24 DIAGNOSIS — Z87.39 HX OF KAWASAKI'S DISEASE: ICD-10-CM

## 2021-08-24 DIAGNOSIS — Z23 NEED FOR VACCINATION: Primary | ICD-10-CM

## 2021-08-24 DIAGNOSIS — Z98.890 HISTORY OF SURGICAL REMOVAL OF PILONIDAL CYST: ICD-10-CM

## 2021-08-24 DIAGNOSIS — Z13.220 LIPID SCREENING: ICD-10-CM

## 2021-08-24 LAB
CHOLEST SERPL-MCNC: 283 MG/DL (ref 0–200)
HDLC SERPL-MCNC: 44 MG/DL (ref 40–60)
LDLC SERPL CALC-MCNC: 199 MG/DL (ref 0–100)
LDLC/HDLC SERPL: 4.48 {RATIO}
TRIGL SERPL-MCNC: 209 MG/DL (ref 0–150)
VLDLC SERPL-MCNC: 40 MG/DL (ref 5–40)

## 2021-08-24 PROCEDURE — 90715 TDAP VACCINE 7 YRS/> IM: CPT | Performed by: STUDENT IN AN ORGANIZED HEALTH CARE EDUCATION/TRAINING PROGRAM

## 2021-08-24 PROCEDURE — 93005 ELECTROCARDIOGRAM TRACING: CPT | Performed by: STUDENT IN AN ORGANIZED HEALTH CARE EDUCATION/TRAINING PROGRAM

## 2021-08-24 PROCEDURE — 93010 ELECTROCARDIOGRAM REPORT: CPT | Performed by: INTERNAL MEDICINE

## 2021-08-24 PROCEDURE — 90471 IMMUNIZATION ADMIN: CPT | Performed by: STUDENT IN AN ORGANIZED HEALTH CARE EDUCATION/TRAINING PROGRAM

## 2021-08-24 PROCEDURE — 99214 OFFICE O/P EST MOD 30 MIN: CPT | Performed by: STUDENT IN AN ORGANIZED HEALTH CARE EDUCATION/TRAINING PROGRAM

## 2021-08-24 PROCEDURE — 80061 LIPID PANEL: CPT | Performed by: STUDENT IN AN ORGANIZED HEALTH CARE EDUCATION/TRAINING PROGRAM

## 2021-08-24 NOTE — PROGRESS NOTES
"Subjective:  Edinson Thomas is a 30 y.o. male who presents for     Patient referred to general surgery and initially seen on 3/11/2021.  Treated for acute anal fissure, did not want treatment for pilonidal disease at that time.  Upon follow-up in 2 weeks on 3/26/2021, had healing of anal fissure, and was evaluated for pilonidal cystectomy with rhomboid flap closure.  Scheduled for 3/31/121, surgery went well without acute complication.  However, did have complication thereafter after backing into a steel post, causing wound to partially open wound site, and some avulsion occurred.  Seen on 7/9/121, after wound care, symptomatic management, had complete healing of pilonidal cystectomy and flap closure.    Since then has been doing well, no pain from procedure site, no discharge, erythema, swelling, fever, chills, bloody stools, pain with defecation. Does have minimal pain with prolonged sitting.     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  Told that he had scarring of the heart, has not had follow-up since.  No chest pain, no shortness of breath, no dyspnea on exertion, orthopnea, swelling, numbness, tingling, weakness, fatigue, palpitations.  Of note, recent cholesterol with LDL of 173.    Patient Active Problem List   Diagnosis   • Pilonidal cyst     Vitals:    Vitals:    08/24/21 0805   BP: 120/86   BP Location: Left arm   Patient Position: Sitting   Cuff Size: Large Adult   Pulse: 76   Temp: 98.5 °F (36.9 °C)   SpO2: 98%   Weight: 127 kg (279 lb 8 oz)   Height: 188 cm (74\")     Body mass index is 35.89 kg/m².    No current outpatient medications on file.    Patient Active Problem List   Diagnosis   • Pilonidal cyst     Past Surgical History:   Procedure Laterality Date   • PILONIDAL CYSTECTOMY N/A 3/31/2021    Procedure: PILONIDAL CYSTECTOMY with rhomboid flap closure ;  Surgeon: Carmencita, " Onel Aquino MD;  Location: [facility];  Service: General;  Laterality: N/A;     Social History     Socioeconomic History   • Marital status: Single     Spouse name: Not on file   • Number of children: Not on file   • Years of education: Not on file   • Highest education level: Not on file   Tobacco Use   • Smoking status: Former Smoker     Quit date: 2019     Years since quittin.5   • Smokeless tobacco: Never Used   Vaping Use   • Vaping Use: Never used   Substance and Sexual Activity   • Alcohol use: Yes     Alcohol/week: 6.0 standard drinks     Types: 3 Glasses of wine, 3 Cans of beer per week     Comment: 6 per week   • Drug use: Never   • Sexual activity: Yes     Partners: Female     Family History   Problem Relation Age of Onset   • Diabetes Father    • Heart failure Father    • Heart attack Father      Admission on 2021, Discharged on 2021   Component Date Value Ref Range Status   • Case Report 2021    Final                    Value:Surgical Pathology Report                         Case: [id number]                                  Authorizing Provider:  Onel Tse,   Collected:           2021 01:02 PM                                 MD                                                                           Ordering Location:     [facility]             Received:            2021 01:26 PM                                 [facility]                                                              Pathologist:           Chandra Arrieta MD                                                       Specimen:    Pilonidal cyst                                                                            • Final Diagnosis 2021    Final                    Value:This result contains rich text formatting which cannot be displayed here.   Lab on 2021   Component Date Value Ref Range Status   • COVID19 2021 Not Detected  Not Detected - Ref. Range Final    Office Visit on 03/04/2021   Component Date Value Ref Range Status   • Total Cholesterol 03/04/2021 234* 0 - 200 mg/dL Final   • Triglycerides 03/04/2021 100  0 - 150 mg/dL Final   • HDL Cholesterol 03/04/2021 43  40 - 60 mg/dL Final   • LDL Cholesterol  03/04/2021 173* 0 - 100 mg/dL Final   • VLDL Cholesterol 03/04/2021 18  5 - 40 mg/dL Final   • LDL/HDL Ratio 03/04/2021 3.98   Final   • Glucose 03/04/2021 85  65 - 99 mg/dL Final   • BUN 03/04/2021 17  6 - 20 mg/dL Final   • Creatinine 03/04/2021 0.87  0.76 - 1.27 mg/dL Final   • Sodium 03/04/2021 139  136 - 145 mmol/L Final   • Potassium 03/04/2021 4.5  3.5 - 5.2 mmol/L Final   • Chloride 03/04/2021 102  98 - 107 mmol/L Final   • CO2 03/04/2021 25.0  22.0 - 29.0 mmol/L Final   • Calcium 03/04/2021 10.0  8.6 - 10.5 mg/dL Final   • Total Protein 03/04/2021 8.4  6.0 - 8.5 g/dL Final   • Albumin 03/04/2021 4.90  3.50 - 5.20 g/dL Final   • ALT (SGPT) 03/04/2021 17  1 - 41 U/L Final   • AST (SGOT) 03/04/2021 20  1 - 40 U/L Final   • Alkaline Phosphatase 03/04/2021 91  39 - 117 U/L Final   • Total Bilirubin 03/04/2021 0.7  0.0 - 1.2 mg/dL Final   • eGFR Non African Amer 03/04/2021 102  >60 mL/min/1.73 Final   • Globulin 03/04/2021 3.5  gm/dL Final   • A/G Ratio 03/04/2021 1.4  g/dL Final   • BUN/Creatinine Ratio 03/04/2021 19.5  7.0 - 25.0 Final   • Anion Gap 03/04/2021 12.0  5.0 - 15.0 mmol/L Final   • Hepatitis C Ab 03/04/2021 Non-Reactive  Non-Reactive Final   • RPR 03/04/2021 Non-Reactive  Non-Reactive Final   • HIV-1/ HIV-2 03/04/2021 Non-Reactive  Non-Reactive Final    A non-reactive test result does not preclude the possibility of exposure to HIV or infection with HIV. An antibody response to recent exposure may take several months to reach detectable levels.   • Chlamydia DNA by PCR 03/04/2021 Negative  Negative Final   • Neisseria gonorrhoeae by PCR 03/04/2021 Negative  Negative Final   • WBC 03/04/2021 7.59  3.40 - 10.80 10*3/mm3 Final   • RBC 03/04/2021 5.42   4.14 - 5.80 10*6/mm3 Final   • Hemoglobin 03/04/2021 15.1  13.0 - 17.7 g/dL Final   • Hematocrit 03/04/2021 44.4  37.5 - 51.0 % Final   • MCV 03/04/2021 81.9  79.0 - 97.0 fL Final   • MCH 03/04/2021 27.9  26.6 - 33.0 pg Final   • MCHC 03/04/2021 34.0  31.5 - 35.7 g/dL Final   • RDW 03/04/2021 12.7  12.3 - 15.4 % Final   • RDW-SD 03/04/2021 36.5* 37.0 - 54.0 fl Final   • MPV 03/04/2021 12.4* 6.0 - 12.0 fL Final   • Platelets 03/04/2021 222  140 - 450 10*3/mm3 Final   • Neutrophil % 03/04/2021 56.6  42.7 - 76.0 % Final   • Lymphocyte % 03/04/2021 28.3  19.6 - 45.3 % Final   • Monocyte % 03/04/2021 9.5  5.0 - 12.0 % Final   • Eosinophil % 03/04/2021 4.0  0.3 - 6.2 % Final   • Basophil % 03/04/2021 1.1  0.0 - 1.5 % Final   • Immature Grans % 03/04/2021 0.5  0.0 - 0.5 % Final   • Neutrophils, Absolute 03/04/2021 4.30  1.70 - 7.00 10*3/mm3 Final   • Lymphocytes, Absolute 03/04/2021 2.15  0.70 - 3.10 10*3/mm3 Final   • Monocytes, Absolute 03/04/2021 0.72  0.10 - 0.90 10*3/mm3 Final   • Eosinophils, Absolute 03/04/2021 0.30  0.00 - 0.40 10*3/mm3 Final   • Basophils, Absolute 03/04/2021 0.08  0.00 - 0.20 10*3/mm3 Final   • Immature Grans, Absolute 03/04/2021 0.04  0.00 - 0.05 10*3/mm3 Final   • nRBC 03/04/2021 0.0  0.0 - 0.2 /100 WBC Final      No image results found.    [unfilled]  Immunization History   Administered Date(s) Administered   • COVID-19 (MODERNA) 12/30/2020, 01/28/2021   • Influenza, Unspecified 11/09/2020     The following portions of the patient's history were reviewed and updated as appropriate: allergies, current medications, past family history, past medical history, past social history, past surgical history and problem list.    PHQ-9 Total Score: 0       Physical Exam  Constitutional:       General: He is not in acute distress.  HENT:      Head: Normocephalic and atraumatic.      Right Ear: Tympanic membrane and ear canal normal.      Left Ear: Tympanic membrane and ear canal normal.       Mouth/Throat:      Mouth: Mucous membranes are moist.      Pharynx: Oropharynx is clear. No posterior oropharyngeal erythema.   Eyes:      Extraocular Movements: Extraocular movements intact.      Pupils: Pupils are equal, round, and reactive to light.   Cardiovascular:      Rate and Rhythm: Normal rate and regular rhythm.      Heart sounds: Normal heart sounds. No murmur heard.     Pulmonary:      Effort: Pulmonary effort is normal.      Breath sounds: Normal breath sounds.   Abdominal:      General: Bowel sounds are normal.      Palpations: Abdomen is soft.      Tenderness: There is no abdominal tenderness.   Genitourinary:     Rectum: No anal fissure.   Musculoskeletal:         General: No swelling.      Right lower leg: No edema.      Left lower leg: No edema.   Skin:     Coloration: Skin is not jaundiced.      Findings: No rash.             Comments: No swelling, erythema, tenderness, fluctuance, discharge, rash, anal fissure noted.   Neurological:      Mental Status: He is alert and oriented to person, place, and time. Mental status is at baseline.   Psychiatric:         Mood and Affect: Mood normal.         Behavior: Behavior normal.       Assessment/Plan    Diagnosis Plan   1. Need for vaccination  Tdap Vaccine Greater Than or Equal To 6yo IM   2. Lipid screening  Lipid Panel   3. Hx of Kawasaki's disease  ECG 12 Lead   4. History of surgical removal of pilonidal cyst        Orders Placed This Encounter   Procedures   • Tdap Vaccine Greater Than or Equal To 6yo IM   • Lipid Panel   • ECG 12 Lead     Order Specific Question:   Reason for Exam:     Answer:   hx of kawasaki     Order Specific Question:   Release to patient     Answer:   Immediate     Pilonidal cyst; healing well, no complications, pain, numbness improving.  Counseled on prognosis, patient voiced understanding.    History of Kawasaki disease; patient reports 'cardiac scaring' but states that he was told he was in good shape, able to participate  in sports. ECG performed today, NSR, no prolonged QTC, no ST changes, reassuring.  Low threshold to start statin medication, repeat LDL as above, if greater than 130, will treat.  Counseled patient on possible adverse effects, voiced understanding, agreeable to plan.           This document has been electronically signed by Ulysses Portillo MD on August 24, 2021 09:09 CDT

## 2021-08-25 NOTE — PROGRESS NOTES
Labs significant for elevated cholesterol, which strongly recommend considering to start a statin medication to reduce chances of a major cardiovascular event.  If amenable, please let us know and we will will send it in for you.

## 2021-08-26 LAB
QT INTERVAL: 442 MS
QTC INTERVAL: 411 MS

## 2021-08-27 ENCOUNTER — TELEPHONE (OUTPATIENT)
Dept: FAMILY MEDICINE CLINIC | Facility: CLINIC | Age: 31
End: 2021-08-27

## 2021-08-30 DIAGNOSIS — E78.5 HYPERLIPIDEMIA, UNSPECIFIED HYPERLIPIDEMIA TYPE: Primary | ICD-10-CM

## 2021-08-30 RX ORDER — ATORVASTATIN CALCIUM 40 MG/1
40 TABLET, FILM COATED ORAL DAILY
Qty: 90 TABLET | Refills: 1 | Status: SHIPPED | OUTPATIENT
Start: 2021-08-30 | End: 2022-06-08

## 2022-06-08 ENCOUNTER — OFFICE VISIT (OUTPATIENT)
Dept: FAMILY MEDICINE CLINIC | Facility: CLINIC | Age: 32
End: 2022-06-08

## 2022-06-08 VITALS
OXYGEN SATURATION: 96 % | TEMPERATURE: 98 F | WEIGHT: 253 LBS | BODY MASS INDEX: 32.47 KG/M2 | SYSTOLIC BLOOD PRESSURE: 130 MMHG | HEART RATE: 50 BPM | DIASTOLIC BLOOD PRESSURE: 84 MMHG | HEIGHT: 74 IN

## 2022-06-08 DIAGNOSIS — S99.921D INJURY OF RIGHT FOOT, SUBSEQUENT ENCOUNTER: Primary | ICD-10-CM

## 2022-06-08 PROCEDURE — 99213 OFFICE O/P EST LOW 20 MIN: CPT | Performed by: NURSE PRACTITIONER

## 2022-06-08 NOTE — PROGRESS NOTES
"Chief Complaint  Pain (Rt foot pain  injury X 8 days ago.  )    Subjective          Edinson Thomas presents to [facility] - [facility]    PCP: Dr. Portillo    CC: \"right foot pain\"    Reports that injury occurred on 5- while working, large concrete piece rolled off truck and onto right foot.  Was wearing steel toe boots at the time.  Has not been turned in as workman comp at this time.  Seen at [facility] same day and had xrays--records requested, but not received at this point.  Reports he was told there was no fracture.  Was sent home without a boot or meds.  Has been trying to elevate in the evenings, ice seemed to make it worse, taking OTC Ibuprofen (was taking 800, now 400 mg in the AM) to make it through the day.  Pain worse with standing/walking.  Reports swelling has subsided some, bruising and pain still present.      Foot Injury   Incident onset: 5-31. The incident occurred at work. The injury mechanism was a direct blow (reports 300-400 pound concrete rolled off truck and over foot). The pain is present in the right foot. The quality of the pain is described as aching and shooting. The pain is at a severity of 8/10. The pain is moderate. The pain has been constant since onset. Associated symptoms include an inability to bear weight ( unable to bear full weight on right foot). Pertinent negatives include no loss of motion, muscle weakness, numbness or tingling. He reports no foreign bodies present. The symptoms are aggravated by palpation, weight bearing and movement. He has tried elevation and NSAIDs for the symptoms. The treatment provided mild relief.       Review of Systems   Constitutional: Negative.    Respiratory: Negative.    Cardiovascular: Negative.    Gastrointestinal: Negative.    Musculoskeletal: Positive for arthralgias (right foot pain).   Skin: Positive for color change (bruising right foot).   Neurological: " "Negative for dizziness, tingling, numbness and headaches.        Objective   Vital Signs:   /84 (BP Location: Right arm, Patient Position: Sitting)   Pulse 50   Temp 98 °F (36.7 °C)   Ht 188 cm (74\")   Wt 115 kg (253 lb)   SpO2 96%   BMI 32.48 kg/m²       Physical Exam  Vitals and nursing note reviewed.   Constitutional:       General: He is not in acute distress.     Appearance: Normal appearance. He is not ill-appearing.   HENT:      Head: Normocephalic and atraumatic.   Musculoskeletal:         General: Swelling ( right foot ) and tenderness ( right foot) present.      Cervical back: Neck supple.      Right foot: Decreased range of motion. Normal capillary refill. Swelling, tenderness and bony tenderness present. Normal pulse.        Legs:    Skin:     General: Skin is warm and dry.      Findings: Bruising present.   Neurological:      General: No focal deficit present.      Mental Status: He is alert and oriented to person, place, and time.   Psychiatric:         Mood and Affect: Mood normal.         Thought Content: Thought content normal.          Result Review :     Common labs    Common Labsle 8/24/21   Total Cholesterol 283 (A)   Triglycerides 209 (A)   HDL Cholesterol 44   LDL Cholesterol  199 (A)   (A) Abnormal value                      Assessment and Plan    Diagnoses and all orders for this visit:    1. Injury of right foot, subsequent encounter (Primary)  -     XR Foot 3+ View Right      CAM boot provided in office ([place])  Repeat xrays today since swelling has subsided some since initial xrays--will notify with results when available  Wishes to continue with OTC Motrin  Continue to elevate    If xrays normal, pain persists, may need to consider MRI, Podiatry referral    Declines RTW note    See PCP or RTC if symptoms persist/worsen  See PCP for routine f/u visit and management of chronic medical conditions      This document has been electronically signed by GABRIELLA Ruiz on June " 8, 2022 09:57 CDT,.

## 2022-08-02 ENCOUNTER — OFFICE VISIT (OUTPATIENT)
Dept: FAMILY MEDICINE CLINIC | Facility: CLINIC | Age: 32
End: 2022-08-02

## 2022-08-02 VITALS
HEIGHT: 74 IN | SYSTOLIC BLOOD PRESSURE: 110 MMHG | TEMPERATURE: 98.2 F | DIASTOLIC BLOOD PRESSURE: 62 MMHG | OXYGEN SATURATION: 97 % | BODY MASS INDEX: 32.16 KG/M2 | WEIGHT: 250.6 LBS | HEART RATE: 68 BPM

## 2022-08-02 DIAGNOSIS — S99.921D INJURY OF RIGHT FOOT, SUBSEQUENT ENCOUNTER: Primary | ICD-10-CM

## 2022-08-02 PROCEDURE — 99213 OFFICE O/P EST LOW 20 MIN: CPT | Performed by: STUDENT IN AN ORGANIZED HEALTH CARE EDUCATION/TRAINING PROGRAM

## 2022-08-04 NOTE — PROGRESS NOTES
"Subjective:  Edinson Thomas is a 31 y.o. male who presents for     Right foot pain; patient states had an injury at work where a several hundred pound item fell off a truck and landed onto his right foot.  Was wearing steel toe boots at the time, immediately took off his boot and check for any injury, lacerations.  States went to ER, had x-ray which was reportedly negative and was discharged home without a walking boot.  Was seen by urgent care given walking boot, wore for several weeks which provided some relief.  Ever since being out of walking boot, has had continued pain with weightbearing.  Located dorsal midfoot area of right foot.  States had significant swelling, color change, sole of foot was black and blue.  Due to continued pain, patient concerned that significant injury occurred.  Worse with walking, better with rest.  No numbness, tingling, weakness currently.       Patient Active Problem List   Diagnosis   • Pilonidal cyst     Vitals:    Vitals:    08/02/22 1652   BP: 110/62   BP Location: Left arm   Patient Position: Sitting   Pulse: 68   Temp: 98.2 °F (36.8 °C)   SpO2: 97%   Weight: 114 kg (250 lb 9.6 oz)   Height: 188 cm (74\")     Body mass index is 32.18 kg/m².    No current outpatient medications on file.    Patient Active Problem List   Diagnosis   • Pilonidal cyst     Past Surgical History:   Procedure Laterality Date   • PILONIDAL CYSTECTOMY N/A 3/31/2021    Procedure: PILONIDAL CYSTECTOMY with rhomboid flap closure ;  Surgeon: Onel Tse MD;  Location: [facility];  Service: General;  Laterality: N/A;     Social History     Socioeconomic History   • Marital status: Single   Tobacco Use   • Smoking status: Former Smoker     Quit date: 1/23/2019     Years since quitting: 3.5   • Smokeless tobacco: Never Used   Vaping Use   • Vaping Use: Never used   Substance and Sexual Activity   • Alcohol use: Yes     Alcohol/week: 6.0 standard drinks     Types: 3 Glasses of wine, 3 Cans of beer " per week     Comment: 6 per week   • Drug use: Never   • Sexual activity: Yes     Partners: Female     Family History   Problem Relation Age of Onset   • Diabetes Father    • Heart failure Father    • Heart attack Father      No visits with results within 6 Month(s) from this visit.   Latest known visit with results is:   Office Visit on 08/24/2021   Component Date Value Ref Range Status   • Total Cholesterol 08/24/2021 283 (A) 0 - 200 mg/dL Final   • Triglycerides 08/24/2021 209 (A) 0 - 150 mg/dL Final   • HDL Cholesterol 08/24/2021 44  40 - 60 mg/dL Final   • LDL Cholesterol  08/24/2021 199 (A) 0 - 100 mg/dL Final   • VLDL Cholesterol 08/24/2021 40  5 - 40 mg/dL Final   • LDL/HDL Ratio 08/24/2021 4.48   Final   • QT Interval 08/24/2021 442  ms Final   • QTC Interval 08/24/2021 411  ms Final      XR Foot 3+ View Right  Narrative: Three view right foot    HISTORY: Right foot injury one week ago. Crush injury.    AP, lateral and oblique views obtained.    COMPARISON: None    FINDINGS:   No fracture or dislocation.  Very small calcaneal spur at insertion of Achilles tendon.  No other osseous or articular abnormality.  Impression: CONCLUSION:  No fracture or dislocation    [id number]    Electronically signed by:  Jose Jessica MD  6/8/2022 8:58 PM CDT  Workstation: [id number]      @[email or web address]@  Immunization History   Administered Date(s) Administered   • COVID-19 (MODERNA) 1st, 2nd, 3rd Dose Only 12/30/2020, 01/28/2021   • Influenza, Unspecified 11/09/2020   • Tdap 08/24/2021     The following portions of the patient's history were reviewed and updated as appropriate: allergies, current medications, past family history, past medical history, past social history, past surgical history and problem list.    PHQ-9 Total Score:             Physical Exam  Constitutional:       General: He is not in acute distress.  HENT:      Head: Normocephalic and atraumatic.   Cardiovascular:      Rate and Rhythm: Normal rate and regular rhythm.       Pulses:           Dorsalis pedis pulses are 2+ on the right side.        Posterior tibial pulses are 2+ on the right side.      Heart sounds: Normal heart sounds. No murmur heard.  Pulmonary:      Effort: Pulmonary effort is normal.      Breath sounds: Normal breath sounds.   Abdominal:      Palpations: Abdomen is soft.      Tenderness: There is no abdominal tenderness.   Musculoskeletal:         General: No swelling.      Right lower leg: No edema.      Left lower leg: No edema.        Feet:    Feet:      Comments: Positive squeeze test of right foot.  Skin:     Coloration: Skin is not jaundiced.      Findings: No rash.   Neurological:      Mental Status: He is alert and oriented to person, place, and time. Mental status is at baseline.   Psychiatric:         Mood and Affect: Mood normal.         Behavior: Behavior normal.         Assessment & Plan    Diagnosis Plan   1. Injury of right foot, subsequent encounter  Ambulatory Referral to Podiatry    MRI foot right wo contrast      Orders Placed This Encounter   Procedures   • MRI foot right wo contrast     Standing Status:   Future     Standing Expiration Date:   8/2/2023     Order Specific Question:   Release to patient     Answer:   Immediate   • Ambulatory Referral to Podiatry     Referral Priority:   Routine     Referral Type:   Consultation     Referral Reason:   Specialty Services Required     Requested Specialty:   Podiatry     Number of Visits Requested:   1     Right foot injury; concern for Lisfranc injury due to reported plantar ecchymosis and positive squeeze test on exam, will refer to podiatry, obtain MRI as above.  Since time of injury greater than 4 weeks ago, patient able to bear weight on foot, unclear if boot is necessary at this time, continue to be very cautious with weightbearing and activity.  Will likely benefit from supervised physical therapy, will defer to podiatry.       This document has been electronically signed by Ulysses Portillo MD  on August 4, 2022 09:42 CDT    EMR Dragon/Transciption Disclaimer: Some of this note may be an electronic transcription/translation of spoken language to printed text.  The electronic translation of spoken language may permit erroneous, or at times, nonsensical words or phrases to be inadvertently transcribed. Although I have reviewed the note for such errors, some may still exist.

## 2022-08-10 ENCOUNTER — HOSPITAL ENCOUNTER (OUTPATIENT)
Dept: MRI IMAGING | Facility: HOSPITAL | Age: 32
Discharge: HOME | End: 2022-08-10
Admitting: STUDENT IN AN ORGANIZED HEALTH CARE EDUCATION/TRAINING PROGRAM

## 2022-08-10 DIAGNOSIS — S99.921D INJURY OF RIGHT FOOT, SUBSEQUENT ENCOUNTER: ICD-10-CM

## 2022-08-10 PROCEDURE — 73718 MRI LOWER EXTREMITY W/O DYE: CPT

## 2022-08-16 ENCOUNTER — OFFICE VISIT (OUTPATIENT)
Dept: PODIATRY | Facility: CLINIC | Age: 32
End: 2022-08-16

## 2022-08-16 VITALS — WEIGHT: 250 LBS | HEIGHT: 74 IN | OXYGEN SATURATION: 98 % | HEART RATE: 85 BPM | BODY MASS INDEX: 32.08 KG/M2

## 2022-08-16 DIAGNOSIS — S99.921A INJURY OF RIGHT FOOT, INITIAL ENCOUNTER: Primary | ICD-10-CM

## 2022-08-16 PROCEDURE — 99203 OFFICE O/P NEW LOW 30 MIN: CPT | Performed by: PODIATRIST

## 2022-08-16 RX ORDER — MELOXICAM 15 MG/1
15 TABLET ORAL DAILY
Qty: 30 TABLET | Refills: 1 | Status: SHIPPED | OUTPATIENT
Start: 2022-08-16 | End: 2022-11-28

## 2022-08-16 NOTE — PROGRESS NOTES
"Edinson Thomas  1990  31 y.o. male      08/16/2022    Chief Complaint   Patient presents with   • Right Foot - Pain       History of Present Illness    Edinson Thomas is a 31 y.o.male patient came to clinic for right foot injury on May 31 st. Had an MRI ON 8/2/2022.      History reviewed. No pertinent past medical history.      Past Surgical History:   Procedure Laterality Date   • PILONIDAL CYSTECTOMY N/A 3/31/2021    Procedure: PILONIDAL CYSTECTOMY with rhomboid flap closure ;  Surgeon: Onel Tse MD;  Location: [facility];  Service: General;  Laterality: N/A;         Family History   Problem Relation Age of Onset   • Diabetes Father    • Heart failure Father    • Heart attack Father        No Known Allergies    Social History     Socioeconomic History   • Marital status: Single   Tobacco Use   • Smoking status: Former Smoker     Quit date: 1/23/2019     Years since quitting: 3.5   • Smokeless tobacco: Never Used   Vaping Use   • Vaping Use: Never used   Substance and Sexual Activity   • Alcohol use: Yes     Alcohol/week: 6.0 standard drinks     Types: 3 Glasses of wine, 3 Cans of beer per week     Comment: 6 per week   • Drug use: Never   • Sexual activity: Yes     Partners: Female         Current Outpatient Medications   Medication Sig Dispense Refill   • meloxicam (MOBIC) 15 MG tablet Take 1 tablet by mouth Daily. Take once daily. 30 tablet 1     No current facility-administered medications for this visit.       Review of Systems   Constitutional: Negative.    HENT: Negative.    Eyes: Negative.    Respiratory: Negative.    Cardiovascular: Negative.    Gastrointestinal: Negative.    Endocrine: Negative.    Genitourinary: Negative.    Musculoskeletal:        FOOT PAIN    Skin: Negative.    Allergic/Immunologic: Negative.    Neurological: Negative.    Hematological: Negative.    Psychiatric/Behavioral: Negative.          OBJECTIVE    Pulse 85   Ht 188 cm (74\")   Wt 113 kg (250 lb)   " SpO2 98%   BMI 32.10 kg/m²     Physical Exam  Vitals reviewed.   Constitutional:       General: He is not in acute distress.     Appearance: He is well-developed.   HENT:      Head: Normocephalic and atraumatic.      Nose: Nose normal.   Eyes:      Conjunctiva/sclera: Conjunctivae normal.      Pupils: Pupils are equal, round, and reactive to light.   Cardiovascular:      Pulses:           Dorsalis pedis pulses are 2+ on the right side.        Posterior tibial pulses are 2+ on the right side.   Pulmonary:      Effort: Pulmonary effort is normal. No respiratory distress.      Breath sounds: No wheezing.   Musculoskeletal:      Right foot: Normal range of motion. No deformity, bunion, Charcot foot, foot drop or prominent metatarsal heads.   Feet:      Right foot:      Skin integrity: Skin integrity normal.   Skin:     General: Skin is warm and dry.      Capillary Refill: Capillary refill takes less than 2 seconds.   Neurological:      Mental Status: He is alert and oriented to person, place, and time.   Psychiatric:         Behavior: Behavior normal.         Thought Content: Thought content normal.                Procedures        ASSESSMENT AND PLAN    Diagnoses and all orders for this visit:    1. Injury of right foot, initial encounter (Primary)    Other orders  -     meloxicam (MOBIC) 15 MG tablet; Take 1 tablet by mouth Daily. Take once daily.  Dispense: 30 tablet; Refill: 1        - Patient examined and evaluated  -Imaging reviewed.  No obvious pathology.  -Right foot is improving slowly.  Recommended compression stocking for edema control.  Recommended OTC arch supports.  Rx for Mobic.  - All questions were answered   - RTC 4 weeks            This document has been electronically signed by Jae Simpson DPM on August 20, 2022 10:49 CDT     8/20/2022  10:49 CDT

## 2022-08-16 NOTE — PROGRESS NOTES
MRI of right foot is abnormal.  Please follow-up with podiatry as scheduled to discuss significance and treatment options.

## 2022-09-13 ENCOUNTER — OFFICE VISIT (OUTPATIENT)
Dept: PODIATRY | Facility: CLINIC | Age: 32
End: 2022-09-13

## 2022-09-13 VITALS — WEIGHT: 250 LBS | BODY MASS INDEX: 32.08 KG/M2 | OXYGEN SATURATION: 97 % | HEART RATE: 78 BPM | HEIGHT: 74 IN

## 2022-09-13 DIAGNOSIS — S99.921D INJURY OF RIGHT FOOT, SUBSEQUENT ENCOUNTER: Primary | ICD-10-CM

## 2022-09-13 DIAGNOSIS — S93.321D: ICD-10-CM

## 2022-09-13 DIAGNOSIS — M20.11 HALLUX VALGUS OF RIGHT FOOT: ICD-10-CM

## 2022-09-13 PROCEDURE — 99214 OFFICE O/P EST MOD 30 MIN: CPT | Performed by: PODIATRIST

## 2022-09-13 NOTE — PROGRESS NOTES
Edinson Thomas  1990  31 y.o. male      09/13/2022    Chief Complaint   Patient presents with   • Right Foot - Follow-up       History of Present Illness    Edinson Thomas is a 31 y.o.male presents to clinic for follow-up of right foot injury on May 31 st. Had an MRI ON 8/2/2022. Xray 9/12/22.  Patient complains of painful clicking and popping in the middle portion of his foot.      History reviewed. No pertinent past medical history.      Past Surgical History:   Procedure Laterality Date   • PILONIDAL CYSTECTOMY N/A 3/31/2021    Procedure: PILONIDAL CYSTECTOMY with rhomboid flap closure ;  Surgeon: Onel Tse MD;  Location: [facility];  Service: General;  Laterality: N/A;         Family History   Problem Relation Age of Onset   • Diabetes Father    • Heart failure Father    • Heart attack Father        No Known Allergies    Social History     Socioeconomic History   • Marital status: Single   Tobacco Use   • Smoking status: Former Smoker     Quit date: 1/23/2019     Years since quitting: 3.6   • Smokeless tobacco: Never Used   Vaping Use   • Vaping Use: Never used   Substance and Sexual Activity   • Alcohol use: Yes     Alcohol/week: 6.0 standard drinks     Types: 3 Glasses of wine, 3 Cans of beer per week     Comment: 6 per week   • Drug use: Never   • Sexual activity: Yes     Partners: Female         Current Outpatient Medications   Medication Sig Dispense Refill   • meloxicam (MOBIC) 15 MG tablet Take 1 tablet by mouth Daily. Take once daily. 30 tablet 1     No current facility-administered medications for this visit.       Review of Systems   Constitutional: Negative.    HENT: Negative.    Eyes: Negative.    Respiratory: Negative.    Cardiovascular: Negative.    Gastrointestinal: Negative.    Endocrine: Negative.    Genitourinary: Negative.    Musculoskeletal:        FOOT PAIN    Skin: Negative.    Allergic/Immunologic: Negative.    Neurological: Negative.    Hematological: Negative.   "  Psychiatric/Behavioral: Negative.          OBJECTIVE    Pulse 78   Ht 188 cm (74\")   Wt 113 kg (250 lb)   SpO2 97%   BMI 32.10 kg/m²     Physical Exam  Vitals reviewed.   Constitutional:       General: He is not in acute distress.     Appearance: He is well-developed.   HENT:      Head: Normocephalic and atraumatic.      Nose: Nose normal.   Eyes:      Conjunctiva/sclera: Conjunctivae normal.      Pupils: Pupils are equal, round, and reactive to light.   Cardiovascular:      Pulses:           Dorsalis pedis pulses are 2+ on the right side.        Posterior tibial pulses are 2+ on the right side.   Pulmonary:      Effort: Pulmonary effort is normal. No respiratory distress.      Breath sounds: No wheezing.   Musculoskeletal:      Right foot: Normal range of motion. Deformity and bunion present. No Charcot foot, foot drop or prominent metatarsal heads.        Feet:    Feet:      Right foot:      Skin integrity: Skin integrity normal.   Skin:     General: Skin is warm and dry.      Capillary Refill: Capillary refill takes less than 2 seconds.   Neurological:      Mental Status: He is alert and oriented to person, place, and time.   Psychiatric:         Behavior: Behavior normal.         Thought Content: Thought content normal.                Procedures        ASSESSMENT AND PLAN    Diagnoses and all orders for this visit:    1. Injury of right foot, subsequent encounter (Primary)  -     XR Foot 3+ View Right  -     CT FOOT RIGHT WITHOUT CONTRAST; Future    2. Hallux valgus of right foot    3. Closed traumatic subluxation of tarsometatarsal joint of right foot, subsequent encounter  -     Case Request; Standing  -     Case Request    Other orders  -     Follow Anesthesia Guidelines / Standing Orders; Future        - Patient examined and evaluated  -Imaging reviewed.  Concern for subtle Lisfranc injury.   -Discussed treatment options both conservative and surgical.  Patient elected for surgical " intervention.  -Surgical plan is arthrodesis of first tarsometatarsal joint with repair of Lisfranc ligament and all indicated procedures on the right foot.  -Risks and benefits of the procedure including but not limited to postoperative infection, incisional dehiscence, numbness, swelling, residual pain and postoperative blood clot discussed in detail.  No guarantees were given or implied.  -Tentative date for the surgery December 19, 2022  -All questions were answered  -Recheck following surgery             This document has been electronically signed by Jae Simpson DPM on September 16, 2022 07:28 CDT     9/16/2022  07:28 CDT

## 2022-09-16 PROBLEM — S93.321A: Status: ACTIVE | Noted: 2022-09-16

## 2022-09-16 RX ORDER — BUPIVACAINE HCL/0.9 % NACL/PF 0.1 %
2 PLASTIC BAG, INJECTION (ML) EPIDURAL ONCE
Status: CANCELLED | OUTPATIENT
Start: 2022-12-19 | End: 2022-09-16

## 2022-09-23 ENCOUNTER — TELEPHONE (OUTPATIENT)
Dept: PODIATRY | Facility: CLINIC | Age: 32
End: 2022-09-23

## 2022-09-23 NOTE — TELEPHONE ENCOUNTER
PT REQUESTS A CALL BACK RE IS SCHEDULED FOR CT SCAN ON 9-29.  PT WANTS TO KNOW IF HE CAN HAVE THE SURGERY WITHOUT GETTING A CT SCAN DONE BECAUSE PT SAYS HE CANT AFFORD IT DUE TO HIS INSURANCE WONT PAY FOR IT.  PT ALSO WANTS TO KNOW IF HE CAN SCHEDULE HIS SURGERY WITHIN THE NEXT 2 WEEKS.  CALL BACK # [phone].  THANK YOU.

## 2022-09-23 NOTE — TELEPHONE ENCOUNTER
Called Patient and informed him we will discuss this on Monday when Dr. Simpson was in office.  Edinson surgery is in December.

## 2022-09-27 NOTE — TELEPHONE ENCOUNTER
Talked to patient and let him know he didn't have to proceed with the CT scan and he decided to keep his surgery date

## 2022-09-30 ENCOUNTER — APPOINTMENT (OUTPATIENT)
Dept: CT IMAGING | Facility: HOSPITAL | Age: 32
End: 2022-09-30

## 2022-11-28 ENCOUNTER — OFFICE VISIT (OUTPATIENT)
Dept: PODIATRY | Facility: CLINIC | Age: 32
End: 2022-11-28

## 2022-11-28 VITALS
HEIGHT: 74 IN | OXYGEN SATURATION: 98 % | BODY MASS INDEX: 32.08 KG/M2 | SYSTOLIC BLOOD PRESSURE: 135 MMHG | HEART RATE: 67 BPM | DIASTOLIC BLOOD PRESSURE: 88 MMHG | WEIGHT: 250 LBS

## 2022-11-28 DIAGNOSIS — S93.321D: ICD-10-CM

## 2022-11-28 DIAGNOSIS — S99.921D INJURY OF RIGHT FOOT, SUBSEQUENT ENCOUNTER: Primary | ICD-10-CM

## 2022-11-28 DIAGNOSIS — M20.11 HALLUX VALGUS OF RIGHT FOOT: ICD-10-CM

## 2022-11-28 PROCEDURE — 99214 OFFICE O/P EST MOD 30 MIN: CPT | Performed by: PODIATRIST

## 2022-12-19 ENCOUNTER — HOSPITAL ENCOUNTER (OUTPATIENT)
Facility: HOSPITAL | Age: 32
Setting detail: HOSPITAL OUTPATIENT SURGERY
Discharge: HOME | End: 2022-12-19
Attending: PODIATRIST | Admitting: ANESTHESIOLOGY

## 2022-12-19 ENCOUNTER — ANESTHESIA EVENT (OUTPATIENT)
Dept: PERIOP | Facility: HOSPITAL | Age: 32
End: 2022-12-19

## 2022-12-19 ENCOUNTER — APPOINTMENT (OUTPATIENT)
Dept: GENERAL RADIOLOGY | Facility: HOSPITAL | Age: 32
End: 2022-12-19

## 2022-12-19 ENCOUNTER — ANESTHESIA (OUTPATIENT)
Dept: PERIOP | Facility: HOSPITAL | Age: 32
End: 2022-12-19

## 2022-12-19 VITALS
HEIGHT: 74 IN | RESPIRATION RATE: 18 BRPM | TEMPERATURE: 97 F | OXYGEN SATURATION: 99 % | WEIGHT: 272.93 LBS | HEART RATE: 70 BPM | BODY MASS INDEX: 35.03 KG/M2 | SYSTOLIC BLOOD PRESSURE: 125 MMHG | DIASTOLIC BLOOD PRESSURE: 88 MMHG

## 2022-12-19 DIAGNOSIS — S93.321D: ICD-10-CM

## 2022-12-19 PROCEDURE — 25010000002 DEXAMETHASONE PER 1 MG: Performed by: NURSE ANESTHETIST, CERTIFIED REGISTERED

## 2022-12-19 PROCEDURE — 25010000002 ROPIVACAINE PER 1 MG: Performed by: NURSE ANESTHETIST, CERTIFIED REGISTERED

## 2022-12-19 PROCEDURE — 25010000002 KETOROLAC TROMETHAMINE PER 15 MG: Performed by: NURSE ANESTHETIST, CERTIFIED REGISTERED

## 2022-12-19 PROCEDURE — 25010000002 CEFAZOLIN PER 500 MG: Performed by: PODIATRIST

## 2022-12-19 PROCEDURE — C1713 ANCHOR/SCREW BN/BN,TIS/BN: HCPCS | Performed by: PODIATRIST

## 2022-12-19 PROCEDURE — 20900 REMOVAL OF BONE FOR GRAFT: CPT | Performed by: PODIATRIST

## 2022-12-19 PROCEDURE — 25010000002 ONDANSETRON PER 1 MG: Performed by: NURSE ANESTHETIST, CERTIFIED REGISTERED

## 2022-12-19 PROCEDURE — 28730 FUSION OF FOOT BONES: CPT | Performed by: PODIATRIST

## 2022-12-19 PROCEDURE — 25010000002 MIDAZOLAM PER 1 MG: Performed by: NURSE ANESTHETIST, CERTIFIED REGISTERED

## 2022-12-19 PROCEDURE — 25010000002 FENTANYL CITRATE (PF) 50 MCG/ML SOLUTION: Performed by: NURSE ANESTHETIST, CERTIFIED REGISTERED

## 2022-12-19 PROCEDURE — 25010000002 PROPOFOL 200 MG/20ML EMULSION: Performed by: NURSE ANESTHETIST, CERTIFIED REGISTERED

## 2022-12-19 PROCEDURE — 76000 FLUOROSCOPY <1 HR PHYS/QHP: CPT

## 2022-12-19 DEVICE — ANATOMIC BIPLANAR IMPLANTS
Type: IMPLANTABLE DEVICE | Site: FOOT | Status: FUNCTIONAL
Brand: LAPIPLASTY MINI INCISION SYSTEM

## 2022-12-19 DEVICE — LOCKING SCREWS
Type: IMPLANTABLE DEVICE | Site: FOOT | Status: FUNCTIONAL
Brand: FASTPITCH 2.7MM HIGH PITCH LOCKING SCREW

## 2022-12-19 DEVICE — 2.7MM HIGH PITCH LOCKING SCREW - 12MM/14MM
Type: IMPLANTABLE DEVICE | Site: FOOT | Status: FUNCTIONAL
Brand: FASTPITCH

## 2022-12-19 DEVICE — 4.0 HEADLESS
Type: IMPLANTABLE DEVICE | Site: FOOT | Status: FUNCTIONAL
Brand: LAPIPLASTY

## 2022-12-19 RX ORDER — DIPHENHYDRAMINE HYDROCHLORIDE 50 MG/ML
12.5 INJECTION INTRAMUSCULAR; INTRAVENOUS
Status: DISCONTINUED | OUTPATIENT
Start: 2022-12-19 | End: 2022-12-19 | Stop reason: HOSPADM

## 2022-12-19 RX ORDER — DIPHENHYDRAMINE HCL 25 MG
25 CAPSULE ORAL
Status: DISCONTINUED | OUTPATIENT
Start: 2022-12-19 | End: 2022-12-19 | Stop reason: HOSPADM

## 2022-12-19 RX ORDER — NALOXONE HCL 0.4 MG/ML
0.4 VIAL (ML) INJECTION AS NEEDED
Status: DISCONTINUED | OUTPATIENT
Start: 2022-12-19 | End: 2022-12-19 | Stop reason: HOSPADM

## 2022-12-19 RX ORDER — HYDRALAZINE HYDROCHLORIDE 20 MG/ML
5 INJECTION INTRAMUSCULAR; INTRAVENOUS
Status: DISCONTINUED | OUTPATIENT
Start: 2022-12-19 | End: 2022-12-19 | Stop reason: HOSPADM

## 2022-12-19 RX ORDER — SODIUM CHLORIDE, SODIUM GLUCONATE, SODIUM ACETATE, POTASSIUM CHLORIDE AND MAGNESIUM CHLORIDE 526; 502; 368; 37; 30 MG/100ML; MG/100ML; MG/100ML; MG/100ML; MG/100ML
1000 INJECTION, SOLUTION INTRAVENOUS CONTINUOUS PRN
Status: DISCONTINUED | OUTPATIENT
Start: 2022-12-19 | End: 2022-12-19 | Stop reason: HOSPADM

## 2022-12-19 RX ORDER — PROPOFOL 10 MG/ML
INJECTION, EMULSION INTRAVENOUS AS NEEDED
Status: DISCONTINUED | OUTPATIENT
Start: 2022-12-19 | End: 2022-12-19 | Stop reason: SURG

## 2022-12-19 RX ORDER — MIDAZOLAM HYDROCHLORIDE 1 MG/ML
INJECTION INTRAMUSCULAR; INTRAVENOUS AS NEEDED
Status: DISCONTINUED | OUTPATIENT
Start: 2022-12-19 | End: 2022-12-19 | Stop reason: SURG

## 2022-12-19 RX ORDER — BUPIVACAINE HCL/0.9 % NACL/PF 0.1 %
2 PLASTIC BAG, INJECTION (ML) EPIDURAL ONCE
Status: COMPLETED | OUTPATIENT
Start: 2022-12-19 | End: 2022-12-19

## 2022-12-19 RX ORDER — ONDANSETRON 2 MG/ML
INJECTION INTRAMUSCULAR; INTRAVENOUS AS NEEDED
Status: DISCONTINUED | OUTPATIENT
Start: 2022-12-19 | End: 2022-12-19 | Stop reason: SURG

## 2022-12-19 RX ORDER — FENTANYL CITRATE 50 UG/ML
INJECTION, SOLUTION INTRAMUSCULAR; INTRAVENOUS AS NEEDED
Status: DISCONTINUED | OUTPATIENT
Start: 2022-12-19 | End: 2022-12-19 | Stop reason: SURG

## 2022-12-19 RX ORDER — LABETALOL HYDROCHLORIDE 5 MG/ML
5 INJECTION, SOLUTION INTRAVENOUS
Status: DISCONTINUED | OUTPATIENT
Start: 2022-12-19 | End: 2022-12-19

## 2022-12-19 RX ORDER — ACETAMINOPHEN 325 MG/1
650 TABLET ORAL ONCE AS NEEDED
Status: DISCONTINUED | OUTPATIENT
Start: 2022-12-19 | End: 2022-12-19 | Stop reason: HOSPADM

## 2022-12-19 RX ORDER — KETOROLAC TROMETHAMINE 30 MG/ML
INJECTION, SOLUTION INTRAMUSCULAR; INTRAVENOUS AS NEEDED
Status: DISCONTINUED | OUTPATIENT
Start: 2022-12-19 | End: 2022-12-19 | Stop reason: SURG

## 2022-12-19 RX ORDER — EPHEDRINE SULFATE 50 MG/ML
5 INJECTION, SOLUTION INTRAVENOUS ONCE AS NEEDED
Status: DISCONTINUED | OUTPATIENT
Start: 2022-12-19 | End: 2022-12-19 | Stop reason: HOSPADM

## 2022-12-19 RX ORDER — IPRATROPIUM BROMIDE AND ALBUTEROL SULFATE 2.5; .5 MG/3ML; MG/3ML
3 SOLUTION RESPIRATORY (INHALATION) ONCE AS NEEDED
Status: DISCONTINUED | OUTPATIENT
Start: 2022-12-19 | End: 2022-12-19 | Stop reason: HOSPADM

## 2022-12-19 RX ORDER — BACITRACIN ZINC 500 [USP'U]/G
OINTMENT TOPICAL AS NEEDED
Status: DISCONTINUED | OUTPATIENT
Start: 2022-12-19 | End: 2022-12-19 | Stop reason: HOSPADM

## 2022-12-19 RX ORDER — OXYCODONE AND ACETAMINOPHEN 7.5; 325 MG/1; MG/1
1 TABLET ORAL EVERY 6 HOURS PRN
Qty: 30 TABLET | Refills: 0 | Status: SHIPPED | OUTPATIENT
Start: 2022-12-19 | End: 2022-12-21 | Stop reason: SDUPTHER

## 2022-12-19 RX ORDER — LIDOCAINE HYDROCHLORIDE 10 MG/ML
INJECTION, SOLUTION EPIDURAL; INFILTRATION; INTRACAUDAL; PERINEURAL AS NEEDED
Status: DISCONTINUED | OUTPATIENT
Start: 2022-12-19 | End: 2022-12-19 | Stop reason: SURG

## 2022-12-19 RX ORDER — ONDANSETRON 2 MG/ML
4 INJECTION INTRAMUSCULAR; INTRAVENOUS ONCE AS NEEDED
Status: DISCONTINUED | OUTPATIENT
Start: 2022-12-19 | End: 2022-12-19 | Stop reason: HOSPADM

## 2022-12-19 RX ORDER — DEXAMETHASONE SODIUM PHOSPHATE 4 MG/ML
INJECTION, SOLUTION INTRA-ARTICULAR; INTRALESIONAL; INTRAMUSCULAR; INTRAVENOUS; SOFT TISSUE AS NEEDED
Status: DISCONTINUED | OUTPATIENT
Start: 2022-12-19 | End: 2022-12-19 | Stop reason: SURG

## 2022-12-19 RX ORDER — ACETAMINOPHEN 650 MG/1
650 SUPPOSITORY RECTAL ONCE AS NEEDED
Status: DISCONTINUED | OUTPATIENT
Start: 2022-12-19 | End: 2022-12-19 | Stop reason: HOSPADM

## 2022-12-19 RX ORDER — BUPIVACAINE HYDROCHLORIDE 2.5 MG/ML
INJECTION, SOLUTION EPIDURAL; INFILTRATION; INTRACAUDAL AS NEEDED
Status: DISCONTINUED | OUTPATIENT
Start: 2022-12-19 | End: 2022-12-19 | Stop reason: HOSPADM

## 2022-12-19 RX ORDER — ROPIVACAINE HYDROCHLORIDE 5 MG/ML
INJECTION, SOLUTION EPIDURAL; INFILTRATION; PERINEURAL
Status: COMPLETED | OUTPATIENT
Start: 2022-12-19 | End: 2022-12-19

## 2022-12-19 RX ADMIN — MIDAZOLAM HYDROCHLORIDE 2 MG: 1 INJECTION, SOLUTION INTRAMUSCULAR; INTRAVENOUS at 12:52

## 2022-12-19 RX ADMIN — SODIUM CHLORIDE, SODIUM GLUCONATE, SODIUM ACETATE, POTASSIUM CHLORIDE AND MAGNESIUM CHLORIDE 1000 ML: 526; 502; 368; 37; 30 INJECTION, SOLUTION INTRAVENOUS at 10:27

## 2022-12-19 RX ADMIN — PROPOFOL 200 MG: 10 INJECTION, EMULSION INTRAVENOUS at 13:26

## 2022-12-19 RX ADMIN — LIDOCAINE HYDROCHLORIDE 20 MG: 10 INJECTION, SOLUTION EPIDURAL; INFILTRATION; INTRACAUDAL; PERINEURAL at 13:26

## 2022-12-19 RX ADMIN — PROPOFOL 50 MG: 10 INJECTION, EMULSION INTRAVENOUS at 13:33

## 2022-12-19 RX ADMIN — ONDANSETRON 4 MG: 2 INJECTION INTRAMUSCULAR; INTRAVENOUS at 14:56

## 2022-12-19 RX ADMIN — ROPIVACAINE HYDROCHLORIDE 30 ML: 5 INJECTION, SOLUTION EPIDURAL; INFILTRATION; PERINEURAL at 12:50

## 2022-12-19 RX ADMIN — LIDOCAINE HYDROCHLORIDE 30 MG: 10 INJECTION, SOLUTION EPIDURAL; INFILTRATION; INTRACAUDAL; PERINEURAL at 12:45

## 2022-12-19 RX ADMIN — DEXAMETHASONE SODIUM PHOSPHATE 4 MG: 4 INJECTION, SOLUTION INTRAMUSCULAR; INTRAVENOUS at 14:20

## 2022-12-19 RX ADMIN — FENTANYL CITRATE 50 MCG: 50 INJECTION, SOLUTION INTRAMUSCULAR; INTRAVENOUS at 14:52

## 2022-12-19 RX ADMIN — KETOROLAC TROMETHAMINE 30 MG: 30 INJECTION, SOLUTION INTRAMUSCULAR; INTRAVENOUS at 14:56

## 2022-12-19 RX ADMIN — FENTANYL CITRATE 50 MCG: 50 INJECTION, SOLUTION INTRAMUSCULAR; INTRAVENOUS at 13:02

## 2022-12-19 RX ADMIN — Medication 2 G: at 13:37

## 2022-12-19 NOTE — ANESTHESIA PREPROCEDURE EVALUATION
Anesthesia Evaluation     Patient summary reviewed and Nursing notes reviewed   no history of anesthetic complications:  NPO Solid Status: > 8 hours  NPO Liquid Status: > 2 hours           Airway   Mallampati: II  TM distance: >3 FB  Neck ROM: full  Possible difficult intubation  Comment: Full beard.Final Airway Details  Final airway type: endotracheal airway        Successful airway: ETT  Cuffed: yes   Successful intubation technique: direct laryngoscopy  Facilitating devices/methods: intubating stylet  Endotracheal tube insertion site: oral  Blade: Chadwick  Blade size: 4  ETT size (mm): 7.5  Cormack-Lehane Classification: grade I - full view of glottis  Placement verified by: chest auscultation and capnometry   Cuff volume (mL): 8  Measured from: lips  ETT/EBT  to lips (cm): 22  Number of attempts at approach: 1  Assessment: lips, teeth, and gum same as pre-op and atraumatic intubation  Dental    (+) poor dentition    Pulmonary     breath sounds clear to auscultation  (-) asthma, not a smoker  Cardiovascular   Exercise tolerance: good (4-7 METS)    Rhythm: regular  Rate: normal    (-) valvular problems/murmurs, dysrhythmias, murmur      Neuro/Psych  (-) seizures  GI/Hepatic/Renal/Endo    (+) obesity,     (-) morbid obesity, GERD, diabetes    Musculoskeletal (-) negative ROS    Abdominal   (+) obese,    Substance History - negative use     OB/GYN negative ob/gyn ROS         Other - negative ROS       ROS/Med Hx Other: 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  Pre-procedure education provided  Plan discussed with CRNA.

## 2022-12-19 NOTE — ANESTHESIA PROCEDURE NOTES
Peripheral Block      Patient reassessed immediately prior to procedure    Patient location during procedure: pre-op  Start time: 12/19/2022 12:40 PM  Stop time: 12/19/2022 12:50 PM  Reason for block: at surgeon's request and post-op pain management  Performed by  CRNA/CAA: Gisel Bull CRNA  Preanesthetic Checklist  Completed: patient identified, IV checked, site marked, risks and benefits discussed, surgical consent, monitors and equipment checked, pre-op evaluation and timeout performed  Prep:  Pt Position: supine  Sterile barriers:cap, mask, gloves and washed/disinfected hands  Prep: ChloraPrep  Patient monitoring: blood pressure monitoring and continuous pulse oximetry  Procedure    Sedation: no  Performed under: local infiltration  Guidance:ultrasound guided    ULTRASOUND INTERPRETATION.  Using ultrasound guidance a gauge needle was placed in close proximity to the sciatic nerve, at which point, under ultrasound guidance anesthetic was injected in the area of the nerve and spread of the anesthesia was seen on ultrasound in close proximity thereto.  There were no abnormalities seen on ultrasound; a digital image was taken; and the patient tolerated the procedure with no complications. Images:still images obtained, printed/placed on chart    Laterality:right  Block Type:popliteal  Injection Technique:single-shot  Needle Type:echogenic  Resistance on Injection: none    Medications Used: ropivacaine (NAROPIN) 0.5 % injection - Injection   30 mL - 12/19/2022 12:50:00 PM

## 2022-12-19 NOTE — ANESTHESIA POSTPROCEDURE EVALUATION
Patient: Edinson Thomas    Procedure Summary     Date: 12/19/22 Room / Location: [facility] OR  / [facility] OR    Anesthesia Start: 1254 Anesthesia Stop: 1514    Procedure: ARTHRODESIS OF TARSOMETATARSAL JOINT WITH REPAIR OF LISFRANC LIGAMENT RIGHT (Right) Diagnosis:       Closed traumatic subluxation of tarsometatarsal joint of right foot, subsequent encounter      (Closed traumatic subluxation of tarsometatarsal joint of right foot, subsequent encounter [S93.281D])    Surgeons: Jae Simpson DPM Provider: Gisel Bull CRNA    Anesthesia Type: general ASA Status: 2          Anesthesia Type: general    Vitals  No vitals data found for the desired time range.          Post Anesthesia Care and Evaluation    Patient location during evaluation: bedside  Patient participation: complete - patient participated  Level of consciousness: awake  Pain score: 0  Pain management: adequate    Airway patency: patent  Anesthetic complications: No anesthetic complications  PONV Status: none  Cardiovascular status: acceptable  Respiratory status: acceptable  Hydration status: acceptable    Comments:    BP:          134/78         Pulse:         84           Resp:          18           Temp:   97.5 °F

## 2022-12-19 NOTE — BRIEF OP NOTE
FOOT MID OPEN REDUCTION INTERNAL FIXATION  Progress Note    Edinson Thomas  12/19/2022    Pre-op Diagnosis:   Closed traumatic subluxation of tarsometatarsal joint of right foot, subsequent encounter [S93.321D]       Post-Op Diagnosis Codes:     * Closed traumatic subluxation of tarsometatarsal joint of right foot, subsequent encounter [S93.321D]    Procedure/CPT® Codes:        Procedure(s):  ARTHRODESIS OF TARSOMETATARSAL JOINT WITH REPAIR OF LISFRANC LIGAMENT RIGHT        Surgeon(s):  Jae Simpson DPM    Anesthesia: General with Block    Staff:   Circulator: Teagan Cordon RN; Octavio Kellogg RN  Scrub Person: Divina Magana  Assistant: Feli Pollard MA  Assistant: Feli Pollard MA      Estimated Blood Loss: minimal    Urine Voided: * No values recorded between 12/19/2022 12:54 PM and 12/19/2022  2:59 PM *    Specimens:                None          Drains: * No LDAs found *    Findings: consistent with pre-op dx        Complications: none     Assistant: Feli Pollard MA  was responsible for performing the following activities: Retraction, Suction, Irrigation and Placing Dressing and their skilled assistance was necessary for the success of this case.    Jae Simpson DPM     Date: 12/19/2022  Time: 15:14 CST

## 2022-12-19 NOTE — DISCHARGE INSTRUCTIONS
Discharge home  Resume normal diet  Keep dressing clean,dry,and intact  Ice and elevate  Nonweight bearing to right lower extremity  Percocet 7.5mg prn pain. Alternate with 600mg ibu  Follow up in clinic within 1 week          This document has been electronically signed by Jae Simpson DPM on December 19, 2022 15:16 CST

## 2022-12-19 NOTE — ANESTHESIA PROCEDURE NOTES
Airway  Urgency: elective    Date/Time: 12/19/2022 1:28 PM  Airway not difficult    General Information and Staff    Patient location during procedure: OR  CRNA/CAA: Gisel Bull, CRNA    Indications and Patient Condition  Indications for airway management: airway protection    Preoxygenated: yes  Mask difficulty assessment: 0 - not attempted    Final Airway Details  Final airway type: supraglottic airway      Successful airway: I-gel  Size 4     Number of attempts at approach: 1  Assessment: lips, teeth, and gum same as pre-op and atraumatic intubation

## 2022-12-19 NOTE — INTERVAL H&P NOTE
H&P reviewed. The patient was examined and there are no changes to the H&P.            This document has been electronically signed by Jae Simpson DPM on December 19, 2022 12:07 CST

## 2022-12-21 DIAGNOSIS — S93.321D: ICD-10-CM

## 2022-12-21 RX ORDER — OXYCODONE AND ACETAMINOPHEN 7.5; 325 MG/1; MG/1
1 TABLET ORAL EVERY 4 HOURS PRN
Qty: 30 TABLET | Refills: 0 | Status: SHIPPED | OUTPATIENT
Start: 2022-12-21 | End: 2023-02-28

## 2022-12-21 NOTE — TELEPHONE ENCOUNTER
Patient had surgery 12/19/22 with Dr Simpson and spoke with him yesterday about pain management and Dr Simpson told him to take more pain medication, now patient is almost out and would like a refill of oxyCODONE-acetaminophen (Percocet) 7.5-325 MG per tablet     Called in to [facility] in [facility].   He is trying to get it called in before the bad weather.  Any questions you may contact patient

## 2022-12-26 NOTE — OP NOTE
Date of Procedure: 12/19/2022     SURGEON: Jae Simpson DPM      Assistant: Feli Pollard MA was responsible for performing the following activities: Retraction, Suction, Irrigation and Placing Dressing and their skilled assistance was necessary for the success of this case.      PREOPERATIVE DIAGNOSIS:   1.  Right tarsometatarsal joint injury  2. hallux valgus deformity right foot     POSTOPERATIVE DIAGNOSIS: Same as preop     PROCEDURES PERFORMED:   1.  Multiple midfoot joint arthrodesis right foot  2.  Harvesting of calcaneal bone graft     ANESTHESIA: General     HEMOSTASIS: Thigh tourniquet set at 220 mmHg     ESTIMATED BLOOD LOSS: 10 mL.     SPECIMEN: none     MATERIALS: Lapiplasty locking plate x 2 with locking and nonlocking screws, 3.5 partially-threaded cannulated screw x1     COMPLICATIONS: None.      CONDITION: Stable.        INDICATIONS FOR PROCEDURE: This is a patient of mine who has history of right foot Lisfranc injury with an associated hallux valgus deformity.  He agrees to undergo the surgical intervention at this time. Consent is signed and documented in the chart. History and physical exam were reviewed prior to patient being taken to the operating room and n.p.o. status was confirmed. No guarantees were given or implied regarding the outcome of this treatment.      DESCRIPTION OF PROCEDURE: After mild sedation was administered by the anesthesia team in the preoperative holding area the patient was transported to the operating room and placed in a supine position.  General anesthesia was then administered and the right lower extremity was prepped and draped in usual sterile technique and a timeout was performed to confirm the correct patient, correct site, and correct procedure.      Attention was then directed to the right lower extremity which was exsanguinated using an Esmarch bandage and the tourniquet was rapidly inflated to 220 mmHg. Attention then directed to the medial aspect  of the foot where a linear incision was made over the first tarsometatarsal joint.  Sharp and blunt dissection taking care to identify and retract all vital neurovascular structures.  First tarsometatarsal joint was then broached with a #15 blade.  Lapiplasty  instrumentation set was used to denude the articular cartilage of the first tarsometatarsal joint.  At this time a small incision was made to the posterior lateral aspect of the heel.  Sharp and blunt dissection down to the lateral wall of the calcaneus.  Arthrex 8 mm bone grafter used to harvest a small dowel of cancellous bone for the arthrodesis site.  The arthrodesis site was then fenestrated with a 2.0 drill bit.  Bone graft inserted into the arthrodesis site.  Utilizing the Lapiplasty instrumentation the hallux valgus deformity was reduced and fixated.  Intraoperative fluoroscopy used during the entire process to ensure that deformity was well corrected and the hardware is well-placed.  At this time the articulation between the medial base of the second metatarsal and medial cuneiform was prepped utilizing a 2.0 drill bit and a power reciprocating rasp.  The articulation was reduced with a point-to-point bone reduction forcep.  A single partially-threaded cannulated screw was then inserted across the articulation.  Intraoperative fluoroscopy again used throughout the entire process to confirm adequate reduction of the Lisfranc articulation and proper placement of hardware. Operative sites were then flushed and closed with Monocryl and nylon.  A sterile dressing was applied.  Tourniquet deflated with a rapid hyperemic response to the distal digits.   Patient tolerated the procedure and anesthesia well and was transported from the operating room to the PACU with vital signs stable and neurovascular status intact to the operative extremity.    Plan to discharge patient home once stable per anesthesia.  Patient can resume normal diet.  Nonweightbearing right  lower extremity.  Follow-up in 1 week.            This document has been electronically signed by Jae Simpson DPM on December 26, 2022 10:17 CST

## 2022-12-27 ENCOUNTER — OFFICE VISIT (OUTPATIENT)
Dept: PODIATRY | Facility: CLINIC | Age: 32
End: 2022-12-27

## 2022-12-27 VITALS
SYSTOLIC BLOOD PRESSURE: 151 MMHG | WEIGHT: 272 LBS | DIASTOLIC BLOOD PRESSURE: 99 MMHG | HEART RATE: 85 BPM | HEIGHT: 74 IN | BODY MASS INDEX: 34.91 KG/M2 | OXYGEN SATURATION: 99 %

## 2022-12-27 DIAGNOSIS — M20.11 HALLUX VALGUS OF RIGHT FOOT: Primary | ICD-10-CM

## 2022-12-27 DIAGNOSIS — S93.321D: ICD-10-CM

## 2022-12-27 PROCEDURE — 99024 POSTOP FOLLOW-UP VISIT: CPT | Performed by: PODIATRIST

## 2022-12-27 NOTE — PROGRESS NOTES
"Edinson Thomas  1990  32 y.o. male      12/27/2022    Chief Complaint   Patient presents with   • Right Foot - Follow-up     Post op       History of Present Illness    Edinson Thomas is a 32 y.o.male presents to clinic today for his post operative appointment. Patient had a right arthrodesis of tarsometatarsal joint with repair of lisfranc ligament on 12/19/2022.     History reviewed. No pertinent past medical history.      Past Surgical History:   Procedure Laterality Date   • PILONIDAL CYSTECTOMY N/A 3/31/2021    Procedure: PILONIDAL CYSTECTOMY with rhomboid flap closure ;  Surgeon: Onel Tse MD;  Location: [facility];  Service: General;  Laterality: N/A;         Family History   Problem Relation Age of Onset   • Diabetes Father    • Heart failure Father    • Heart attack Father        No Known Allergies    Social History     Socioeconomic History   • Marital status:    Tobacco Use   • Smoking status: Former     Types: Cigarettes     Quit date: 1/23/2019     Years since quitting: 3.9   • Smokeless tobacco: Never   Vaping Use   • Vaping Use: Never used   Substance and Sexual Activity   • Alcohol use: Yes     Alcohol/week: 6.0 standard drinks     Types: 3 Glasses of wine, 3 Cans of beer per week     Comment: weekly   • Drug use: Never   • Sexual activity: Yes     Partners: Female     Comment:          Current Outpatient Medications   Medication Sig Dispense Refill   • oxyCODONE-acetaminophen (Percocet) 7.5-325 MG per tablet Take 1 tablet by mouth Every 4 (Four) Hours As Needed for Moderate Pain. 30 tablet 0     No current facility-administered medications for this visit.       Review of Systems   Constitutional: Negative.    Gastrointestinal: Negative.    Psychiatric/Behavioral: Negative.          OBJECTIVE    /99   Pulse 85   Ht 188 cm (74\")   Wt 123 kg (272 lb)   SpO2 99%   BMI 34.92 kg/m²     Physical Exam  Vitals reviewed.   Constitutional:       General: He is " not in acute distress.     Appearance: He is well-developed.   HENT:      Head: Normocephalic and atraumatic.      Nose: Nose normal.   Eyes:      Conjunctiva/sclera: Conjunctivae normal.      Pupils: Pupils are equal, round, and reactive to light.   Pulmonary:      Effort: Pulmonary effort is normal. No respiratory distress.      Breath sounds: No wheezing.   Skin:     General: Skin is warm and dry.      Capillary Refill: Capillary refill takes less than 2 seconds.   Neurological:      Mental Status: He is alert and oriented to person, place, and time.   Psychiatric:         Behavior: Behavior normal.         Thought Content: Thought content normal.            Right lower extremity: Incision site is well approximated.  No signs of dehiscence.  Moderate edema and ecchymosis.    Procedures        ASSESSMENT AND PLAN    Diagnoses and all orders for this visit:    1. Hallux valgus of right foot (Primary)    2. Closed traumatic subluxation of tarsometatarsal joint of right foot, subsequent encounter        -Patient doing well postoperatively.  Site care as instructed.  Nonweightbearing right lower extremity.  Recheck 1 week, suture removal repeat radiographs            This document has been electronically signed by Jae Simpson DPM on December 27, 2022 14:42 CST     12/27/2022  14:42 CST

## 2023-01-03 ENCOUNTER — OFFICE VISIT (OUTPATIENT)
Dept: PODIATRY | Facility: CLINIC | Age: 33
End: 2023-01-03
Payer: OTHER MISCELLANEOUS

## 2023-01-03 VITALS — HEIGHT: 74 IN | HEART RATE: 86 BPM | BODY MASS INDEX: 34.91 KG/M2 | WEIGHT: 272 LBS | OXYGEN SATURATION: 94 %

## 2023-01-03 DIAGNOSIS — M20.11 HALLUX VALGUS OF RIGHT FOOT: Primary | ICD-10-CM

## 2023-01-03 PROCEDURE — 99024 POSTOP FOLLOW-UP VISIT: CPT | Performed by: PODIATRIST

## 2023-01-03 PROCEDURE — 1160F RVW MEDS BY RX/DR IN RCRD: CPT | Performed by: PODIATRIST

## 2023-01-03 PROCEDURE — 1159F MED LIST DOCD IN RCRD: CPT | Performed by: PODIATRIST

## 2023-01-03 RX ORDER — HYDROCODONE BITARTRATE AND ACETAMINOPHEN 7.5; 325 MG/1; MG/1
1 TABLET ORAL EVERY 8 HOURS PRN
Qty: 42 TABLET | Refills: 0 | Status: SHIPPED | OUTPATIENT
Start: 2023-01-03 | End: 2023-02-28

## 2023-01-03 NOTE — PROGRESS NOTES
Edinson Thomas  1990  32 y.o. male      01/03/2023      Chief Complaint   Patient presents with   • Right Foot - Follow-up       History of Present Illness    Edinson Thomas is a 32 y.o.male presents to clinic today for his post operative appointment. Patient had a right arthrodesis of tarsometatarsal joint with repair of lisfranc ligament on 12/19/2022.     History reviewed. No pertinent past medical history.      Past Surgical History:   Procedure Laterality Date   • PILONIDAL CYSTECTOMY N/A 3/31/2021    Procedure: PILONIDAL CYSTECTOMY with rhomboid flap closure ;  Surgeon: Onel Tse MD;  Location: [facility];  Service: General;  Laterality: N/A;         Family History   Problem Relation Age of Onset   • Diabetes Father    • Heart failure Father    • Heart attack Father        No Known Allergies    Social History     Socioeconomic History   • Marital status:    Tobacco Use   • Smoking status: Former     Types: Cigarettes     Quit date: 1/23/2019     Years since quitting: 3.9   • Smokeless tobacco: Never   Vaping Use   • Vaping Use: Never used   Substance and Sexual Activity   • Alcohol use: Yes     Alcohol/week: 6.0 standard drinks     Types: 3 Glasses of wine, 3 Cans of beer per week     Comment: weekly   • Drug use: Never   • Sexual activity: Yes     Partners: Female     Comment:          Current Outpatient Medications   Medication Sig Dispense Refill   • oxyCODONE-acetaminophen (Percocet) 7.5-325 MG per tablet Take 1 tablet by mouth Every 4 (Four) Hours As Needed for Moderate Pain. 30 tablet 0     No current facility-administered medications for this visit.       Review of Systems   Constitutional: Negative.    Gastrointestinal: Negative.    Psychiatric/Behavioral: Negative.          OBJECTIVE    Pulse 86   Ht 188 cm (74\")   Wt 123 kg (272 lb)   SpO2 94%   BMI 34.92 kg/m²     Physical Exam  Vitals reviewed.   Constitutional:       General: He is not in acute  distress.     Appearance: He is well-developed.   HENT:      Head: Normocephalic and atraumatic.      Nose: Nose normal.   Eyes:      Conjunctiva/sclera: Conjunctivae normal.      Pupils: Pupils are equal, round, and reactive to light.   Pulmonary:      Effort: Pulmonary effort is normal. No respiratory distress.      Breath sounds: No wheezing.   Skin:     General: Skin is warm and dry.      Capillary Refill: Capillary refill takes less than 2 seconds.   Neurological:      Mental Status: He is alert and oriented to person, place, and time.   Psychiatric:         Behavior: Behavior normal.         Thought Content: Thought content normal.            Right lower extremity: Incision site is well approximated.  No signs of dehiscence.  Mild edema.     Procedures        ASSESSMENT AND PLAN    Diagnoses and all orders for this visit:    1. Hallux valgus of right foot (Primary)  -     XR Foot 3+ View Right        -Patient doing well postoperatively.  Radiographs taken.  Sutures removed.  Progressed to weightbearing in cam boot.  Recheck 4 weeks            This document has been electronically signed by Jae Simpson DPM on January 3, 2023 10:04 CST     1/3/2023  10:04 CST

## 2023-01-13 ENCOUNTER — TELEPHONE (OUTPATIENT)
Dept: PODIATRY | Facility: CLINIC | Age: 33
End: 2023-01-13
Payer: OTHER MISCELLANEOUS

## 2023-01-31 ENCOUNTER — OFFICE VISIT (OUTPATIENT)
Dept: PODIATRY | Facility: CLINIC | Age: 33
End: 2023-01-31
Payer: OTHER MISCELLANEOUS

## 2023-01-31 VITALS — WEIGHT: 272 LBS | HEART RATE: 82 BPM | HEIGHT: 74 IN | BODY MASS INDEX: 34.91 KG/M2 | OXYGEN SATURATION: 99 %

## 2023-01-31 DIAGNOSIS — S93.321D: ICD-10-CM

## 2023-01-31 DIAGNOSIS — M20.11 HALLUX VALGUS OF RIGHT FOOT: Primary | ICD-10-CM

## 2023-01-31 PROCEDURE — 99024 POSTOP FOLLOW-UP VISIT: CPT | Performed by: NURSE PRACTITIONER

## 2023-01-31 NOTE — PROGRESS NOTES
Edinson Thomas  1990  32 y.o. male      2023    Chief Complaint   Patient presents with   • Right Foot - Post-op       History of Present Illness    Edinson Thomas is a 32 y.o.male came to clinic for his six week post operative appointment. Patient xray obtained today. DOS was on 2022.       History reviewed. No pertinent past medical history.      Past Surgical History:   Procedure Laterality Date   • ORIF FOOT FRACTURE Right 2022    Procedure: ARTHRODESIS OF TARSOMETATARSAL JOINT WITH REPAIR OF LISFRANC LIGAMENT RIGHT;  Surgeon: Jae Simpson DPM;  Location: [facility];  Service: Podiatry;  Laterality: Right;   • PILONIDAL CYSTECTOMY N/A 3/31/2021    Procedure: PILONIDAL CYSTECTOMY with rhomboid flap closure ;  Surgeon: Onel Tse MD;  Location: [facility];  Service: General;  Laterality: N/A;         Family History   Problem Relation Age of Onset   • Diabetes Father    • Heart failure Father    • Heart attack Father        No Known Allergies    Social History     Socioeconomic History   • Marital status:    Tobacco Use   • Smoking status: Former     Types: Cigarettes     Quit date: 2019     Years since quittin.0   • Smokeless tobacco: Never   Vaping Use   • Vaping Use: Never used   Substance and Sexual Activity   • Alcohol use: Yes     Alcohol/week: 6.0 standard drinks     Types: 3 Glasses of wine, 3 Cans of beer per week     Comment: weekly   • Drug use: Never   • Sexual activity: Yes     Partners: Female     Comment:          Current Outpatient Medications   Medication Sig Dispense Refill   • HYDROcodone-acetaminophen (Norco) 7.5-325 MG per tablet Take 1 tablet by mouth Every 8 (Eight) Hours As Needed for Moderate Pain. 42 tablet 0   • oxyCODONE-acetaminophen (Percocet) 7.5-325 MG per tablet Take 1 tablet by mouth Every 4 (Four) Hours As Needed for Moderate Pain. 30 tablet 0     No current facility-administered medications for this visit.  "      Review of Systems   Musculoskeletal:        Foot pain    All other systems reviewed and are negative.        OBJECTIVE    Pulse 82   Ht 188 cm (74\")   Wt 123 kg (272 lb)   SpO2 99%   BMI 34.92 kg/m²     Physical Exam  Vitals reviewed.   Constitutional:       Appearance: Normal appearance. He is well-developed.   HENT:      Head: Normocephalic and atraumatic.   Neck:      Trachea: Trachea and phonation normal.   Pulmonary:      Effort: Pulmonary effort is normal. No respiratory distress.   Abdominal:      General: There is no distension.      Palpations: Abdomen is soft.   Musculoskeletal:        Feet:    Feet:      Left foot:      Protective Sensation: 10 sites tested. 10 sites sensed.      Skin integrity: Skin integrity normal.      Toenail Condition: Left toenails are normal.   Skin:     General: Skin is warm and dry.   Neurological:      Mental Status: He is alert and oriented to person, place, and time.      GCS: GCS eye subscore is 4. GCS verbal subscore is 5. GCS motor subscore is 6.   Psychiatric:         Speech: Speech normal.         Behavior: Behavior normal. Behavior is cooperative.         Thought Content: Thought content normal.         Judgment: Judgment normal.                Procedures        ASSESSMENT AND PLAN    Diagnoses and all orders for this visit:    1. Hallux valgus of right foot (Primary)  -     Cancel: XR Foot 3+ View Right    2. Closed traumatic subluxation of tarsometatarsal joint of right foot, subsequent encounter  -     Cancel: XR Foot 3+ View Right        Reviewed x-rays, patient status post 6 weeks surgery and pain/swelling has continued to improve.  At this point will recommend transitioning and into good shoe gear based on his pain and swelling and follow-up in 4 weeks for recheck with repeat x-rays prior to the office visit.  He was instructed to contact the office for any problems in the meantime.          This document has been electronically signed by Arjun CRUZ" Mirian QUIROZ, FNP-C, ONP-C on January 31, 2023 10:33 CST

## 2023-02-28 ENCOUNTER — OFFICE VISIT (OUTPATIENT)
Dept: PODIATRY | Facility: CLINIC | Age: 33
End: 2023-02-28
Payer: OTHER MISCELLANEOUS

## 2023-02-28 VITALS
WEIGHT: 272 LBS | SYSTOLIC BLOOD PRESSURE: 123 MMHG | OXYGEN SATURATION: 97 % | HEIGHT: 74 IN | DIASTOLIC BLOOD PRESSURE: 74 MMHG | BODY MASS INDEX: 34.91 KG/M2 | HEART RATE: 85 BPM

## 2023-02-28 DIAGNOSIS — S93.321D: Primary | ICD-10-CM

## 2023-02-28 PROCEDURE — 99024 POSTOP FOLLOW-UP VISIT: CPT | Performed by: NURSE PRACTITIONER

## 2023-02-28 NOTE — PROGRESS NOTES
Edinson Thomas  1990  32 y.o. male      2023        Chief Complaint   Patient presents with   • Right Foot - Follow-up       History of Present Illness    Edinson Thomas is a 32 y.o.male presents to clinic today for his post operative appointment. Patient had a right arthrodesis of tarsometatarsal joint with repair of lisfranc ligament on 2022. Xrays obtained today. Pt doing well ambulating in regular footgear. Pt c/o intermittent swelling to R hallux that has improved.    History reviewed. No pertinent past medical history.      Past Surgical History:   Procedure Laterality Date   • ORIF FOOT FRACTURE Right 2022    Procedure: ARTHRODESIS OF TARSOMETATARSAL JOINT WITH REPAIR OF LISFRANC LIGAMENT RIGHT;  Surgeon: Jae Simpson DPM;  Location: [facility];  Service: Podiatry;  Laterality: Right;   • PILONIDAL CYSTECTOMY N/A 3/31/2021    Procedure: PILONIDAL CYSTECTOMY with rhomboid flap closure ;  Surgeon: Onel Tse MD;  Location: [facility];  Service: General;  Laterality: N/A;         Family History   Problem Relation Age of Onset   • Diabetes Father    • Heart failure Father    • Heart attack Father        No Known Allergies    Social History     Socioeconomic History   • Marital status:    Tobacco Use   • Smoking status: Former     Types: Cigarettes     Quit date: 2019     Years since quittin.1   • Smokeless tobacco: Never   Vaping Use   • Vaping Use: Never used   Substance and Sexual Activity   • Alcohol use: Yes     Alcohol/week: 6.0 standard drinks     Types: 3 Glasses of wine, 3 Cans of beer per week     Comment: weekly   • Drug use: Never   • Sexual activity: Yes     Partners: Female     Comment:          No current outpatient medications on file.     No current facility-administered medications for this visit.       Review of Systems   Constitutional: Negative.    Musculoskeletal: Positive for joint swelling.        Intermittent edema R  "hallux   Skin: Negative.    Neurological: Negative.    Psychiatric/Behavioral: Negative.          OBJECTIVE    /74   Pulse 85   Ht 188 cm (74\")   Wt 123 kg (272 lb)   SpO2 97%   BMI 34.92 kg/m²     Physical Exam  Vitals reviewed.   Constitutional:       General: He is not in acute distress.     Appearance: He is well-developed.   HENT:      Head: Normocephalic and atraumatic.      Nose: Nose normal.   Eyes:      Conjunctiva/sclera: Conjunctivae normal.      Pupils: Pupils are equal, round, and reactive to light.   Cardiovascular:      Pulses: Normal pulses.           Dorsalis pedis pulses are 2+ on the right side.        Posterior tibial pulses are 2+ on the right side.   Pulmonary:      Effort: Pulmonary effort is normal. No respiratory distress.   Musculoskeletal:      Right foot: Normal range of motion.        Feet:    Feet:      Right foot:      Skin integrity: No ulcer or erythema.   Skin:     General: Skin is warm and dry.      Capillary Refill: Capillary refill takes less than 2 seconds.   Neurological:      Mental Status: He is alert and oriented to person, place, and time.   Psychiatric:         Behavior: Behavior normal.         Thought Content: Thought content normal.            Right lower extremity: Incision site closed.      Procedures        ASSESSMENT AND PLAN    Diagnoses and all orders for this visit:    1. Closed traumatic subluxation of tarsometatarsal joint of right foot, subsequent encounter (Primary)  -     XR Foot 3+ View Right        -Patient doing well postoperatively.    -Radiographs taken and reviewed.  -Recommending powerstep pinnacle plus inserts to support foot in work boots.  -F/u 1 month, sooner if any issues arise.            This document has been electronically signed by GABRIELLA Peña on February 28, 2023 17:15 CST      "

## 2023-03-28 ENCOUNTER — OFFICE VISIT (OUTPATIENT)
Dept: PODIATRY | Facility: CLINIC | Age: 33
End: 2023-03-28
Payer: OTHER MISCELLANEOUS

## 2023-03-28 VITALS
SYSTOLIC BLOOD PRESSURE: 138 MMHG | WEIGHT: 272 LBS | HEIGHT: 74 IN | DIASTOLIC BLOOD PRESSURE: 94 MMHG | HEART RATE: 78 BPM | OXYGEN SATURATION: 99 % | BODY MASS INDEX: 34.91 KG/M2

## 2023-03-28 DIAGNOSIS — S93.321D: Primary | ICD-10-CM

## 2023-03-28 PROCEDURE — 99024 POSTOP FOLLOW-UP VISIT: CPT | Performed by: PODIATRIST

## 2023-03-28 NOTE — PROGRESS NOTES
Edinson Thomas  1990  32 y.o. male      2023        Chief Complaint   Patient presents with   • Right Foot - Pain, Follow-up, Post-op       History of Present Illness    Edinson Thomas is a 32 y.o.male presents to clinic today for his post operative appointment. Patient had a right arthrodesis of tarsometatarsal joint with repair of lisfranc ligament on 2022. Xrays obtained today. Pt doing well ambulating in regular footgear. Pt c/o intermittent swelling to R hallux that has improved.    History reviewed. No pertinent past medical history.      Past Surgical History:   Procedure Laterality Date   • ORIF FOOT FRACTURE Right 2022    Procedure: ARTHRODESIS OF TARSOMETATARSAL JOINT WITH REPAIR OF LISFRANC LIGAMENT RIGHT;  Surgeon: Jae Simpson DPM;  Location: [facility];  Service: Podiatry;  Laterality: Right;   • PILONIDAL CYSTECTOMY N/A 3/31/2021    Procedure: PILONIDAL CYSTECTOMY with rhomboid flap closure ;  Surgeon: Onel Tse MD;  Location: [facility];  Service: General;  Laterality: N/A;         Family History   Problem Relation Age of Onset   • Diabetes Father    • Heart failure Father    • Heart attack Father        No Known Allergies    Social History     Socioeconomic History   • Marital status:    Tobacco Use   • Smoking status: Former     Types: Cigarettes     Quit date: 2019     Years since quittin.1   • Smokeless tobacco: Never   Vaping Use   • Vaping Use: Never used   Substance and Sexual Activity   • Alcohol use: Yes     Alcohol/week: 6.0 standard drinks     Types: 3 Glasses of wine, 3 Cans of beer per week     Comment: weekly   • Drug use: Never   • Sexual activity: Yes     Partners: Female     Comment:          No current outpatient medications on file.     No current facility-administered medications for this visit.       Review of Systems   Constitutional: Negative.    Musculoskeletal: Positive for joint swelling.         "Intermittent edema R hallux   Skin: Negative.    Neurological: Negative.    Psychiatric/Behavioral: Negative.          OBJECTIVE    /94   Pulse 78   Ht 188 cm (74\")   Wt 123 kg (272 lb)   SpO2 99%   BMI 34.92 kg/m²     Physical Exam  Vitals reviewed.   Constitutional:       General: He is not in acute distress.     Appearance: He is well-developed.   HENT:      Head: Normocephalic and atraumatic.      Nose: Nose normal.   Eyes:      Conjunctiva/sclera: Conjunctivae normal.      Pupils: Pupils are equal, round, and reactive to light.   Cardiovascular:      Pulses: Normal pulses.           Dorsalis pedis pulses are 2+ on the right side.        Posterior tibial pulses are 2+ on the right side.   Pulmonary:      Effort: Pulmonary effort is normal. No respiratory distress.   Musculoskeletal:      Right foot: Normal range of motion.        Feet:    Feet:      Right foot:      Skin integrity: No ulcer or erythema.   Skin:     General: Skin is warm and dry.      Capillary Refill: Capillary refill takes less than 2 seconds.   Neurological:      Mental Status: He is alert and oriented to person, place, and time.   Psychiatric:         Behavior: Behavior normal.         Thought Content: Thought content normal.            Right lower extremity: Incision site closed.      Procedures        ASSESSMENT AND PLAN    Diagnoses and all orders for this visit:    1. Closed traumatic subluxation of tarsometatarsal joint of right foot, subsequent encounter (Primary)  -     XR Foot 3+ View Right        -Patient doing well postoperatively.    -Radiographs taken and reviewed.  -Continue compression stockings.  -All questions answered  -Recheck 4 weeks          This document has been electronically signed by Jae Simpson DPM on April 16, 2023 16:51 CDT     "

## 2023-05-09 ENCOUNTER — OFFICE VISIT (OUTPATIENT)
Dept: PODIATRY | Facility: CLINIC | Age: 33
End: 2023-05-09
Payer: OTHER MISCELLANEOUS

## 2023-05-09 VITALS — HEART RATE: 78 BPM | OXYGEN SATURATION: 98 % | HEIGHT: 74 IN | WEIGHT: 272 LBS | BODY MASS INDEX: 34.91 KG/M2

## 2023-05-09 DIAGNOSIS — S93.321D: Primary | ICD-10-CM

## 2023-05-09 DIAGNOSIS — M20.11 HALLUX VALGUS OF RIGHT FOOT: ICD-10-CM

## 2023-05-09 NOTE — PROGRESS NOTES
Edinson Thomas  1990  32 y.o. male    2023    Chief Complaint   Patient presents with   • Right Foot - Pain, Follow-up       History of Present Illness    Edinson Thomas is a 32 y.o.male presents to clinic today for his post operative appointment. Patient had a right arthrodesis of tarsometatarsal joint with repair of lisfranc ligament on 2022. Xrays obtained today. Pt doing well ambulating in regular footgear. Pt c/o intermittent swelling to R hallux that has improved.    History reviewed. No pertinent past medical history.      Past Surgical History:   Procedure Laterality Date   • ORIF FOOT FRACTURE Right 2022    Procedure: ARTHRODESIS OF TARSOMETATARSAL JOINT WITH REPAIR OF LISFRANC LIGAMENT RIGHT;  Surgeon: Jae Simpson DPM;  Location: [facility];  Service: Podiatry;  Laterality: Right;   • PILONIDAL CYSTECTOMY N/A 3/31/2021    Procedure: PILONIDAL CYSTECTOMY with rhomboid flap closure ;  Surgeon: Onel Tse MD;  Location: [facility];  Service: General;  Laterality: N/A;         Family History   Problem Relation Age of Onset   • Diabetes Father    • Heart failure Father    • Heart attack Father        No Known Allergies    Social History     Socioeconomic History   • Marital status:    Tobacco Use   • Smoking status: Former     Types: Cigarettes     Quit date: 2019     Years since quittin.2   • Smokeless tobacco: Never   Vaping Use   • Vaping Use: Never used   Substance and Sexual Activity   • Alcohol use: Yes     Alcohol/week: 6.0 standard drinks     Types: 3 Glasses of wine, 3 Cans of beer per week     Comment: weekly   • Drug use: Never   • Sexual activity: Yes     Partners: Female     Comment:          No current outpatient medications on file.     No current facility-administered medications for this visit.       Review of Systems   Constitutional: Negative.    Musculoskeletal: Positive for joint swelling.        Intermittent edema R  "hallux   Skin: Negative.    Neurological: Negative.    Psychiatric/Behavioral: Negative.          OBJECTIVE    Pulse 78   Ht 188 cm (74\")   Wt 123 kg (272 lb)   SpO2 98%   BMI 34.92 kg/m²     Physical Exam  Vitals reviewed.   Constitutional:       General: He is not in acute distress.     Appearance: He is well-developed.   HENT:      Head: Normocephalic and atraumatic.      Nose: Nose normal.   Eyes:      Conjunctiva/sclera: Conjunctivae normal.      Pupils: Pupils are equal, round, and reactive to light.   Cardiovascular:      Pulses: Normal pulses.           Dorsalis pedis pulses are 2+ on the right side.        Posterior tibial pulses are 2+ on the right side.   Pulmonary:      Effort: Pulmonary effort is normal. No respiratory distress.   Musculoskeletal:      Right foot: Normal range of motion.        Feet:    Feet:      Right foot:      Skin integrity: No ulcer or erythema.   Skin:     General: Skin is warm and dry.      Capillary Refill: Capillary refill takes less than 2 seconds.   Neurological:      Mental Status: He is alert and oriented to person, place, and time.   Psychiatric:         Behavior: Behavior normal.         Thought Content: Thought content normal.            Procedures        ASSESSMENT AND PLAN    Diagnoses and all orders for this visit:    1. Closed traumatic subluxation of tarsometatarsal joint of right foot, subsequent encounter (Primary)  -     XR Foot 3+ View Right    2. Hallux valgus of right foot        -Patient doing well postoperatively.    -Radiographs taken and reviewed.  -Continue compression stockings for edema control.  Progress activity as tolerated without restriction..  -All questions answered  -Recheck 8 weeks, repeat radiographs          This document has been electronically signed by Jae Simpson DPM on May 9, 2023 08:59 CDT     "

## 2023-06-15 ENCOUNTER — OFFICE VISIT (OUTPATIENT)
Dept: FAMILY MEDICINE CLINIC | Facility: CLINIC | Age: 33
End: 2023-06-15
Payer: COMMERCIAL

## 2023-06-15 ENCOUNTER — LAB (OUTPATIENT)
Dept: LAB | Facility: HOSPITAL | Age: 33
End: 2023-06-15
Payer: COMMERCIAL

## 2023-06-15 VITALS
BODY MASS INDEX: 35.42 KG/M2 | HEART RATE: 75 BPM | DIASTOLIC BLOOD PRESSURE: 80 MMHG | TEMPERATURE: 98.3 F | OXYGEN SATURATION: 99 % | HEIGHT: 74 IN | WEIGHT: 276 LBS | SYSTOLIC BLOOD PRESSURE: 120 MMHG

## 2023-06-15 DIAGNOSIS — S80.862A INSECT BITE OF LEFT LOWER LEG, INITIAL ENCOUNTER: Primary | ICD-10-CM

## 2023-06-15 DIAGNOSIS — S80.862A INSECT BITE OF LEFT LOWER LEG, INITIAL ENCOUNTER: ICD-10-CM
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
## 2023-06-15 PROCEDURE — 86757 RICKETTSIA ANTIBODY: CPT

## 2023-06-15 PROCEDURE — 86666 EHRLICHIA ANTIBODY: CPT

## 2023-06-15 PROCEDURE — 86618 LYME DISEASE ANTIBODY: CPT

## 2023-06-15 RX ORDER — DOXYCYCLINE HYCLATE 100 MG/1
100 CAPSULE ORAL 2 TIMES DAILY
Qty: 20 CAPSULE | Refills: 0 | Status: SHIPPED | OUTPATIENT
Start: 2023-06-15 | End: 2023-06-25

## 2023-06-15 NOTE — PROGRESS NOTES
"Chief Complaint  Insect Bite (X 1 wk ago left lower leg  sore )    Subjective        Edinson Onel Thomas presents to [facility] [place]  History of Present Illness  [facility]      PCP: Dr. Portillo    CC: \"insect bite\"    Pt reports he found an insect bite on the medial aspect of his left lower extremity 10-14 days ago. 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 Pt reports that it only itched for the first couple of days, no associated pain. 4 days ago, pt reports he noticed a rash about the size of a dime come up around the area of the bite. Rash is localized to the area of the bite. Pt reports that the rash has increased in size each day since initial finding. Pt reports that he has not taken any medication or put any creams or ointments to the affected area. Pt reports tenderness upon palpation. Pt denies any chest pain, shortness of breath, palpitations, numbness or tingling, fever, fatigue, joint pain, nausea, vomiting, diarrhea, itching, or other pain.  Denies any known history of previous tickborne illness.     Objective   Vital Signs:  /80 (BP Location: Right arm, Patient Position: Sitting, Cuff Size: Large Adult)   Pulse 75   Temp 98.3 °F (36.8 °C) (Oral)   Ht 188 cm (74\")   Wt 125 kg (276 lb)   SpO2 99%   BMI 35.44 kg/m²   Estimated body mass index is 35.44 kg/m² as calculated from the following:    Height as of this encounter: 188 cm (74\").    Weight as of this encounter: 125 kg (276 lb).             Physical Exam  Constitutional:       Appearance: Normal appearance.   HENT:      Head: Normocephalic and atraumatic.   Cardiovascular:      Rate and Rhythm: Normal rate and regular rhythm.      Heart sounds: Normal heart sounds.   Pulmonary:      Effort: Pulmonary effort is normal. No respiratory distress.      Breath sounds: Normal breath sounds. No stridor. No wheezing.   Chest: "      Chest wall: No tenderness.   Abdominal:      General: Bowel sounds are normal.      Palpations: Abdomen is soft.      Tenderness: There is no abdominal tenderness.   Musculoskeletal:         General: Tenderness present.      Cervical back: Neck supple.      Comments: Tenderness to rash upon palpation   Lymphadenopathy:      Cervical: No cervical adenopathy.   Skin:     General: Skin is warm and dry.      Coloration: Skin is not pale.      Findings: Rash present.             Comments: 4x4 cm rash to medial aspect of left lower extremity above ankle. Rash is localized and endorses redness without raising, ecchymosis, TTP   Neurological:      General: No focal deficit present.      Mental Status: He is alert and oriented to person, place, and time.   Psychiatric:         Mood and Affect: Mood normal.         Behavior: Behavior normal.         Judgment: Judgment normal.      Result Review :                   Assessment and Plan   Diagnoses and all orders for this visit:    1. Insect bite of left lower leg, initial encounter (Primary)  -     doxycycline (VIBRAMYCIN) 100 MG capsule; Take 1 capsule by mouth 2 (Two) Times a Day for 10 days.  Dispense: 20 capsule; Refill: 0  -     Lyme Disease Total Antibody With Reflex to Immunoassay; Future  -     Morrill SF (IgG / M); Future  -     Ehrlichia Antibody Panel; Future    Rx for Doxycycline provided to cover for possible tickborne illness--discussed sun sensitivity/precautions while taking  Tick titers pending--will notify with results when available  Elevate affected extremity whenever possible. If area is pruritic, may use hydrocortisone/benadryl cream to area PRN.     See PCP or RTC if symptoms persist/worsen  See PCP for routine f/u visit and management of chronic medical conditions    This document has been electronically signed by GABRIELLA Ruiz on Marcelina 15, 2023 15:49 CDT,.

## 2023-06-17 LAB — B BURGDOR IGG+IGM SER QL IA: NEGATIVE

## 2023-06-20 LAB
R RICKETTSI IGG SER QL IA: POSITIVE
R RICKETTSI IGG TITR SER IF: NORMAL {TITER}
R RICKETTSI IGM SER-ACNC: 0.96 INDEX (ref 0–0.89)

## 2023-06-22 LAB
A PHAGOCYTOPH IGG TITR SER IF: NEGATIVE {TITER}
A PHAGOCYTOPH IGM TITR SER IF: NEGATIVE {TITER}
E CHAFFEENSIS IGG TITR SER IF: NEGATIVE {TITER}
E CHAFFEENSIS IGM TITR SER IF: NEGATIVE {TITER}

## (undated) DEVICE — DISPOSABLE BIPOLAR CODE, 12' (3.66 M): Brand: CONMED

## (undated) DEVICE — PATIENT RETURN ELECTRODE, SINGLE-USE, CONTACT QUALITY MONITORING, ADULT, WITH 9FT CORD, FOR PATIENTS WEIGING OVER 33LBS. (15KG): Brand: MEGADYNE

## (undated) DEVICE — GLV SURG TRIUMPH LT PF LTX 6.5 STRL

## (undated) DEVICE — BLD CLIP UNIV SURG GRY

## (undated) DEVICE — ANTIBACTERIAL UNDYED BRAIDED (POLYGLACTIN 910), SYNTHETIC ABSORBABLE SUTURE: Brand: COATED VICRYL

## (undated) DEVICE — TBG PENCL TELESCP MEGADYNE SMOKE EVAC 10FT

## (undated) DEVICE — SOL IRR NACL 0.9PCT BT 1000ML

## (undated) DEVICE — PAD,ABDOMINAL,8"X10",ST,LF: Brand: MEDLINE

## (undated) DEVICE — LARGE TEAR CROSS CUT RASP (14.0 X 7.0MM)

## (undated) DEVICE — SUT VIC 3/0 SH 27IN J416H

## (undated) DEVICE — SUT VIC 2/0 SH 27IN

## (undated) DEVICE — PENCL ES MEGADINE EZ/CLEAN BUTN W/HOLSTR 10FT

## (undated) DEVICE — GLV SURG TRIUMPH LT PF LTX 7.5 STRL

## (undated) DEVICE — SPNG GZ WOVN 4X4IN 12PLY 10/BX STRL

## (undated) DEVICE — PK POD 60

## (undated) DEVICE — BLD BEAVR MINI RND/TIP GRN

## (undated) DEVICE — NDL HYPO ECLPS SFTY 25G 1 1/2IN

## (undated) DEVICE — DISPOSABLE TOURNIQUET CUFF SINGLE BLADDER, DUAL PORT AND QUICK CONNECT CONNECTOR: Brand: COLOR CUFF

## (undated) DEVICE — GOWN,PREVENTION PLUS,XLNG/XXLARGE,STRL: Brand: MEDLINE

## (undated) DEVICE — BLD SAW LAPIPLASTY STRYKER 40X11MM 1P/U STRL

## (undated) DEVICE — GLV SURG SENSICARE POLYISPRN W/ALOE PF LF 6.5 GRN STRL

## (undated) DEVICE — GLV SURG SENSICARE PI LF PF 8 GRN STRL

## (undated) DEVICE — STERILE POLYISOPRENE POWDER-FREE SURGICAL GLOVES WITH EMOLLIENT COATING: Brand: PROTEXIS

## (undated) DEVICE — PREP PVP-I 7.5P BT 4OZ

## (undated) DEVICE — PK MAJ PROC LF 60

## (undated) DEVICE — SUT ETHLN 3-0 FS118IN 663H

## (undated) DEVICE — 3M™ DURAPORE™ SURGICAL TAPE 1538-3, 3 INCH X 10 YARD (7,5CM X 9,1M), 4 ROLLS/BOX: Brand: 3M™ DURAPORE™

## (undated) DEVICE — SUT MONOCRYL 4/0 PS2 27IN Y426H ETY426H

## (undated) DEVICE — SUT MNCRYL 3/0 Y936H

## (undated) DEVICE — SPEEDRELEASE™ GUIDED RELEASE INSTRUMENT: Brand: SCALPEL

## (undated) DEVICE — COVER,MAYO STAND,STERILE: Brand: MEDLINE

## (undated) DEVICE — 3M™ STERI-STRIP™ REINFORCED ADHESIVE SKIN CLOSURES, R1547, 1/2 IN X 4 IN (12 MM X 100 MM), 6 STRIPS/ENVELOPE: Brand: 3M™ STERI-STRIP™

## (undated) DEVICE — BNDG GZ SOF-FORM CONFRM 3X75IN LF STRL

## (undated) DEVICE — GLV SURG ORTHO BIOGEL OPTIFIT PF LTX SZ8

## (undated) DEVICE — STERILE POLYISOPRENE POWDER-FREE SURGICAL GLOVES: Brand: PROTEXIS

## (undated) DEVICE — GLV SURG SIGNATURE ESSENTIAL PF LTX SZ8

## (undated) DEVICE — GLV SURG SENSICARE PI ORTHO PF SZ7 LF STRL

## (undated) DEVICE — UNDRPD BREATH 23X36 BG/10

## (undated) DEVICE — BNDG ESMARK 6INX9FT STRL

## (undated) DEVICE — SYS HARVST BONE/GRFT OSTEOAUGER 8MM

## (undated) DEVICE — CVR C/ARM MINI

## (undated) DEVICE — ADHS LIQ MASTISOL 2/3ML

## (undated) DEVICE — SPNG LAP 18X18IN LF STRL PK/5

## (undated) DEVICE — PREP SOL POVIDONE/IODINE BT 4OZ

## (undated) DEVICE — SHEET, DRAPE, SPLIT, STERILE: Brand: MEDLINE

## (undated) DEVICE — CVR FLUOROSCOPE C/ARM W/TP 36X28IN

## (undated) DEVICE — GLV SURG SENSICARE PI ORTHO SZ6.5 LF STRL